# Patient Record
Sex: FEMALE | Race: BLACK OR AFRICAN AMERICAN | NOT HISPANIC OR LATINO | URBAN - METROPOLITAN AREA
[De-identification: names, ages, dates, MRNs, and addresses within clinical notes are randomized per-mention and may not be internally consistent; named-entity substitution may affect disease eponyms.]

---

## 2017-01-31 ENCOUNTER — EMERGENCY (EMERGENCY)
Facility: HOSPITAL | Age: 52
LOS: 1 days | Discharge: HOME | End: 2017-01-31
Attending: EMERGENCY MEDICINE | Admitting: EMERGENCY MEDICINE
Payer: MEDICAID

## 2017-01-31 VITALS
TEMPERATURE: 98 F | HEART RATE: 80 BPM | DIASTOLIC BLOOD PRESSURE: 89 MMHG | SYSTOLIC BLOOD PRESSURE: 147 MMHG | RESPIRATION RATE: 20 BRPM | OXYGEN SATURATION: 100 %

## 2017-01-31 VITALS
SYSTOLIC BLOOD PRESSURE: 176 MMHG | TEMPERATURE: 98 F | OXYGEN SATURATION: 100 % | DIASTOLIC BLOOD PRESSURE: 111 MMHG | RESPIRATION RATE: 16 BRPM | HEART RATE: 74 BPM

## 2017-01-31 DIAGNOSIS — S82.64XA NONDISPLACED FRACTURE OF LATERAL MALLEOLUS OF RIGHT FIBULA, INITIAL ENCOUNTER FOR CLOSED FRACTURE: ICD-10-CM

## 2017-01-31 DIAGNOSIS — Z88.8 ALLERGY STATUS TO OTHER DRUGS, MEDICAMENTS AND BIOLOGICAL SUBSTANCES: ICD-10-CM

## 2017-01-31 DIAGNOSIS — I10 ESSENTIAL (PRIMARY) HYPERTENSION: ICD-10-CM

## 2017-01-31 DIAGNOSIS — R42 DIZZINESS AND GIDDINESS: ICD-10-CM

## 2017-01-31 LAB
ALBUMIN SERPL ELPH-MCNC: 4.1 G/DL — SIGNIFICANT CHANGE UP (ref 3.4–5)
ALP SERPL-CCNC: 95 U/L — SIGNIFICANT CHANGE UP (ref 40–120)
ALT FLD-CCNC: 19 U/L — SIGNIFICANT CHANGE UP (ref 12–42)
ANION GAP SERPL CALC-SCNC: 10 MMOL/L — SIGNIFICANT CHANGE UP (ref 9–16)
AST SERPL-CCNC: 22 U/L — SIGNIFICANT CHANGE UP (ref 15–37)
BASOPHILS NFR BLD AUTO: 0.3 % — SIGNIFICANT CHANGE UP (ref 0–2)
BILIRUB SERPL-MCNC: 0.4 MG/DL — SIGNIFICANT CHANGE UP (ref 0.2–1.2)
BUN SERPL-MCNC: 14 MG/DL — SIGNIFICANT CHANGE UP (ref 7–23)
CALCIUM SERPL-MCNC: 9.8 MG/DL — SIGNIFICANT CHANGE UP (ref 8.5–10.5)
CHLORIDE SERPL-SCNC: 105 MMOL/L — SIGNIFICANT CHANGE UP (ref 96–108)
CO2 SERPL-SCNC: 27 MMOL/L — SIGNIFICANT CHANGE UP (ref 22–31)
CREAT SERPL-MCNC: 0.71 MG/DL — SIGNIFICANT CHANGE UP (ref 0.5–1.3)
EOSINOPHIL NFR BLD AUTO: 0.9 % — SIGNIFICANT CHANGE UP (ref 0–6)
GLUCOSE SERPL-MCNC: 99 MG/DL — SIGNIFICANT CHANGE UP (ref 70–99)
HCT VFR BLD CALC: 37 % — SIGNIFICANT CHANGE UP (ref 34.5–45)
HGB BLD-MCNC: 12.8 G/DL — SIGNIFICANT CHANGE UP (ref 11.5–15.5)
IMM GRANULOCYTES NFR BLD AUTO: 0.3 % — SIGNIFICANT CHANGE UP (ref 0–1.5)
LYMPHOCYTES # BLD AUTO: 29.2 % — SIGNIFICANT CHANGE UP (ref 13–44)
MCHC RBC-ENTMCNC: 30.3 PG — SIGNIFICANT CHANGE UP (ref 27–34)
MCHC RBC-ENTMCNC: 34.6 G/DL — SIGNIFICANT CHANGE UP (ref 32–36)
MCV RBC AUTO: 87.7 FL — SIGNIFICANT CHANGE UP (ref 80–100)
MONOCYTES NFR BLD AUTO: 6 % — SIGNIFICANT CHANGE UP (ref 2–14)
NEUTROPHILS NFR BLD AUTO: 63.3 % — SIGNIFICANT CHANGE UP (ref 43–77)
PLATELET # BLD AUTO: 193 K/UL — SIGNIFICANT CHANGE UP (ref 150–400)
POTASSIUM SERPL-MCNC: 3.8 MMOL/L — SIGNIFICANT CHANGE UP (ref 3.5–5.3)
POTASSIUM SERPL-SCNC: 3.8 MMOL/L — SIGNIFICANT CHANGE UP (ref 3.5–5.3)
PROT SERPL-MCNC: 8.5 G/DL — HIGH (ref 6.4–8.2)
RBC # BLD: 4.22 M/UL — SIGNIFICANT CHANGE UP (ref 3.8–5.2)
RBC # FLD: 13.3 % — SIGNIFICANT CHANGE UP (ref 10.3–16.9)
SODIUM SERPL-SCNC: 142 MMOL/L — SIGNIFICANT CHANGE UP (ref 132–145)
TROPONIN I SERPL-MCNC: <0.017 NG/ML — LOW (ref 0.02–0.06)
WBC # BLD: 6.9 K/UL — SIGNIFICANT CHANGE UP (ref 3.8–10.5)
WBC # FLD AUTO: 6.9 K/UL — SIGNIFICANT CHANGE UP (ref 3.8–10.5)

## 2017-01-31 PROCEDURE — 70450 CT HEAD/BRAIN W/O DYE: CPT | Mod: 26

## 2017-01-31 PROCEDURE — 27786 TREATMENT OF ANKLE FRACTURE: CPT | Mod: 54

## 2017-01-31 PROCEDURE — 99285 EMERGENCY DEPT VISIT HI MDM: CPT | Mod: 25

## 2017-01-31 PROCEDURE — 73610 X-RAY EXAM OF ANKLE: CPT | Mod: 26,RT

## 2017-01-31 PROCEDURE — 93010 ELECTROCARDIOGRAM REPORT: CPT | Mod: 59

## 2017-01-31 PROCEDURE — 71020: CPT | Mod: 26

## 2017-01-31 RX ORDER — SODIUM CHLORIDE 9 MG/ML
3 INJECTION INTRAMUSCULAR; INTRAVENOUS; SUBCUTANEOUS ONCE
Qty: 0 | Refills: 0 | Status: COMPLETED | OUTPATIENT
Start: 2017-01-31 | End: 2017-01-31

## 2017-01-31 RX ORDER — SODIUM CHLORIDE 9 MG/ML
1000 INJECTION INTRAMUSCULAR; INTRAVENOUS; SUBCUTANEOUS ONCE
Qty: 0 | Refills: 0 | Status: COMPLETED | OUTPATIENT
Start: 2017-01-31 | End: 2017-01-31

## 2017-01-31 RX ADMIN — SODIUM CHLORIDE 1000 MILLILITER(S): 9 INJECTION INTRAMUSCULAR; INTRAVENOUS; SUBCUTANEOUS at 15:33

## 2017-01-31 RX ADMIN — SODIUM CHLORIDE 3 MILLILITER(S): 9 INJECTION INTRAMUSCULAR; INTRAVENOUS; SUBCUTANEOUS at 15:26

## 2017-01-31 NOTE — ED PROCEDURE NOTE - CPROC ED POST PROC CARE GUIDE1
Instructed patient/caregiver regarding signs and symptoms of infection./Keep the cast/splint/dressing clean and dry./Verbal/written post procedure instructions were given to patient/caregiver./Instructed patient/caregiver to follow-up with primary care physician./Elevate the injured extremity as instructed.

## 2017-01-31 NOTE — ED PROVIDER NOTE - NS ED MD SCRIBE ATTENDING SCRIBE SECTIONS
HISTORY OF PRESENT ILLNESS/VITAL SIGNS( Pullset)/PHYSICAL EXAM/DISPOSITION/PAST MEDICAL/SURGICAL/SOCIAL HISTORY/HIV/REVIEW OF SYSTEMS PAST MEDICAL/SURGICAL/SOCIAL HISTORY/REVIEW OF SYSTEMS/HIV/RESULTS/HISTORY OF PRESENT ILLNESS/VITAL SIGNS( Pullset)/PHYSICAL EXAM/DISPOSITION

## 2017-01-31 NOTE — ED PROVIDER NOTE - MUSCULOSKELETAL, MLM
Spine appears normal, range of motion is not limited, mild right ankle swelling, no deformity proximal , Spine appears normal, range of motion is not limited, mild right ankle swelling, no deformity

## 2017-01-31 NOTE — ED ADULT TRIAGE NOTE - CHIEF COMPLAINT QUOTE
Pt presents with ankle pain s/p bending over to reach for something, feeling dizzy, then falling onto her buttocks and twisting her ankle. BP elevated in triage.

## 2017-01-31 NOTE — ED PROVIDER NOTE - OBJECTIVE STATEMENT
52 yo F with a hx of borderline DM, HTN, nkda, currently on water pill and Asprin presents s/p fall after having a dizziness episode while carrying her tray at work today. Pt states she felt dizzy and fell backwards. Denies head trauma, LOC, CP, SOB, abd pain, N/V, HA and cough. Pt states that she lives in assisted living. +right ankle pain. Notes that she has been feeling sick for the past couple of days and has an appointment with PCP coming up. Last smoked marijuana 2 months ago. Denies illicit drug use.

## 2017-01-31 NOTE — ED PROVIDER NOTE - PROGRESS NOTE DETAILS
Pt presented to the ER with brief episode of dizziness at work. Vital signs are stable, and EKG is normal. no neurovascular deficiency. work up including head CT and troponin within normal limits. Has non-displaced fibular fracture, splint applied. stable for dc and f/u with PMD and ortho.

## 2017-01-31 NOTE — ED PROVIDER NOTE - NEUROLOGICAL, MLM
Alert and oriented, no focal deficits, no motor or sensory deficits. Alert and oriented, no focal deficits, no motor or sensory deficits. CNs are intact, Strength 5/5 in all extremities, answers questions appropriately.

## 2017-02-07 PROBLEM — Z00.00 ENCOUNTER FOR PREVENTIVE HEALTH EXAMINATION: Status: ACTIVE | Noted: 2017-02-07

## 2017-02-08 ENCOUNTER — APPOINTMENT (OUTPATIENT)
Dept: ORTHOPEDIC SURGERY | Facility: CLINIC | Age: 52
End: 2017-02-08

## 2017-02-08 VITALS
TEMPERATURE: 98.1 F | HEIGHT: 65 IN | WEIGHT: 170 LBS | SYSTOLIC BLOOD PRESSURE: 125 MMHG | BODY MASS INDEX: 28.32 KG/M2 | DIASTOLIC BLOOD PRESSURE: 82 MMHG | HEART RATE: 80 BPM

## 2017-02-08 DIAGNOSIS — Z82.61 FAMILY HISTORY OF ARTHRITIS: ICD-10-CM

## 2017-02-08 DIAGNOSIS — Z78.9 OTHER SPECIFIED HEALTH STATUS: ICD-10-CM

## 2017-02-08 DIAGNOSIS — Z86.79 PERSONAL HISTORY OF OTHER DISEASES OF THE CIRCULATORY SYSTEM: ICD-10-CM

## 2017-02-08 DIAGNOSIS — Z87.39 PERSONAL HISTORY OF OTHER DISEASES OF THE MUSCULOSKELETAL SYSTEM AND CONNECTIVE TISSUE: ICD-10-CM

## 2017-02-09 PROBLEM — Z78.9 DOES NOT USE ILLICIT DRUGS: Status: ACTIVE | Noted: 2017-02-08

## 2017-02-09 PROBLEM — Z78.9 EXERCISES OCCASIONALLY: Status: ACTIVE | Noted: 2017-02-08

## 2017-02-09 PROBLEM — Z87.39 HISTORY OF RHEUMATOID ARTHRITIS: Status: RESOLVED | Noted: 2017-02-08 | Resolved: 2017-02-09

## 2017-02-09 PROBLEM — Z87.39 HISTORY OF ARTHRITIS: Status: RESOLVED | Noted: 2017-02-08 | Resolved: 2017-02-09

## 2017-02-09 PROBLEM — Z86.79 HISTORY OF HYPERTENSION: Status: RESOLVED | Noted: 2017-02-08 | Resolved: 2017-02-09

## 2017-02-09 PROBLEM — Z82.61 FAMILY HISTORY OF ARTHRITIS: Status: ACTIVE | Noted: 2017-02-08

## 2017-02-10 ENCOUNTER — FORM ENCOUNTER (OUTPATIENT)
Age: 52
End: 2017-02-10

## 2017-02-11 ENCOUNTER — OUTPATIENT (OUTPATIENT)
Dept: OUTPATIENT SERVICES | Facility: HOSPITAL | Age: 52
LOS: 1 days | End: 2017-02-11
Payer: MEDICAID

## 2017-02-11 ENCOUNTER — APPOINTMENT (OUTPATIENT)
Dept: CT IMAGING | Facility: CLINIC | Age: 52
End: 2017-02-11

## 2017-02-11 DIAGNOSIS — S82.851A DISPLACED TRIMALLEOLAR FRACTURE OF RIGHT LOWER LEG, INITIAL ENCOUNTER FOR CLOSED FRACTURE: ICD-10-CM

## 2017-02-11 PROCEDURE — 76377 3D RENDER W/INTRP POSTPROCES: CPT

## 2017-02-11 PROCEDURE — 73700 CT LOWER EXTREMITY W/O DYE: CPT

## 2017-02-14 ENCOUNTER — OUTPATIENT (OUTPATIENT)
Dept: OUTPATIENT SERVICES | Facility: HOSPITAL | Age: 52
LOS: 1 days | Discharge: HOME | End: 2017-02-14
Payer: MEDICAID

## 2017-02-14 DIAGNOSIS — Z98.51 TUBAL LIGATION STATUS: Chronic | ICD-10-CM

## 2017-02-14 DIAGNOSIS — Z98.891 HISTORY OF UTERINE SCAR FROM PREVIOUS SURGERY: Chronic | ICD-10-CM
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
## 2017-02-14 DIAGNOSIS — J45.909 UNSPECIFIED ASTHMA, UNCOMPLICATED: ICD-10-CM
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
## 2017-02-14 DIAGNOSIS — D57.3 SICKLE-CELL TRAIT: ICD-10-CM

## 2017-02-14 DIAGNOSIS — Z88.8 ALLERGY STATUS TO OTHER DRUGS, MEDICAMENTS AND BIOLOGICAL SUBSTANCES: ICD-10-CM

## 2017-02-14 DIAGNOSIS — S82.851P: ICD-10-CM

## 2017-02-14 DIAGNOSIS — S82.831A OTHER FRACTURE OF UPPER AND LOWER END OF RIGHT FIBULA, INITIAL ENCOUNTER FOR CLOSED FRACTURE: ICD-10-CM

## 2017-02-14 DIAGNOSIS — Z90.89 ACQUIRED ABSENCE OF OTHER ORGANS: Chronic | ICD-10-CM

## 2017-02-14 DIAGNOSIS — Z01.818 ENCOUNTER FOR OTHER PREPROCEDURAL EXAMINATION: ICD-10-CM

## 2017-02-14 LAB
ANION GAP SERPL CALC-SCNC: 9 MMOL/L — SIGNIFICANT CHANGE UP (ref 5–17)
APPEARANCE UR: CLEAR — SIGNIFICANT CHANGE UP
APTT BLD: 31.6 SEC — SIGNIFICANT CHANGE UP (ref 27.5–37.4)
BASOPHILS # BLD AUTO: 0.1 K/UL — SIGNIFICANT CHANGE UP (ref 0–0.2)
BASOPHILS NFR BLD AUTO: 0.8 % — SIGNIFICANT CHANGE UP (ref 0–2)
BILIRUB UR-MCNC: NEGATIVE — SIGNIFICANT CHANGE UP
BLD GP AB SCN SERPL QL: SIGNIFICANT CHANGE UP
BUN SERPL-MCNC: 21 MG/DL — SIGNIFICANT CHANGE UP (ref 7–23)
CALCIUM SERPL-MCNC: 9.1 MG/DL — SIGNIFICANT CHANGE UP (ref 8.5–10.1)
CHLORIDE SERPL-SCNC: 106 MMOL/L — SIGNIFICANT CHANGE UP (ref 96–108)
CO2 SERPL-SCNC: 28 MMOL/L — SIGNIFICANT CHANGE UP (ref 22–31)
COLOR SPEC: YELLOW — SIGNIFICANT CHANGE UP
CREAT SERPL-MCNC: 0.69 MG/DL — SIGNIFICANT CHANGE UP (ref 0.5–1.3)
DIFF PNL FLD: (no result)
EOSINOPHIL # BLD AUTO: 0.1 K/UL — SIGNIFICANT CHANGE UP (ref 0–0.5)
EOSINOPHIL NFR BLD AUTO: 1.5 % — SIGNIFICANT CHANGE UP (ref 0–6)
EPI CELLS # UR: SIGNIFICANT CHANGE UP
GLUCOSE SERPL-MCNC: 100 MG/DL — HIGH (ref 70–99)
GLUCOSE UR QL: NEGATIVE MG/DL — SIGNIFICANT CHANGE UP
HCT VFR BLD CALC: 34.2 % — LOW (ref 34.5–45)
HGB BLD-MCNC: 11.3 G/DL — LOW (ref 11.5–15.5)
INR BLD: 1.12 RATIO — SIGNIFICANT CHANGE UP (ref 0.88–1.16)
KETONES UR-MCNC: NEGATIVE — SIGNIFICANT CHANGE UP
LEUKOCYTE ESTERASE UR-ACNC: (no result)
LYMPHOCYTES # BLD AUTO: 1.9 K/UL — SIGNIFICANT CHANGE UP (ref 1–3.3)
LYMPHOCYTES # BLD AUTO: 29 % — SIGNIFICANT CHANGE UP (ref 13–44)
MCHC RBC-ENTMCNC: 30 PG — SIGNIFICANT CHANGE UP (ref 27–34)
MCHC RBC-ENTMCNC: 32.9 GM/DL — SIGNIFICANT CHANGE UP (ref 32–36)
MCV RBC AUTO: 91.1 FL — SIGNIFICANT CHANGE UP (ref 80–100)
MONOCYTES # BLD AUTO: 0.4 K/UL — SIGNIFICANT CHANGE UP (ref 0–0.9)
MONOCYTES NFR BLD AUTO: 5.5 % — SIGNIFICANT CHANGE UP (ref 2–14)
NEUTROPHILS # BLD AUTO: 4.2 K/UL — SIGNIFICANT CHANGE UP (ref 1.8–7.4)
NEUTROPHILS NFR BLD AUTO: 63.3 % — SIGNIFICANT CHANGE UP (ref 43–77)
NITRITE UR-MCNC: NEGATIVE — SIGNIFICANT CHANGE UP
PH UR: 5 — SIGNIFICANT CHANGE UP (ref 4.8–8)
PLATELET # BLD AUTO: 250 K/UL — SIGNIFICANT CHANGE UP (ref 150–400)
POTASSIUM SERPL-MCNC: 4 MMOL/L — SIGNIFICANT CHANGE UP (ref 3.5–5.3)
POTASSIUM SERPL-SCNC: 4 MMOL/L — SIGNIFICANT CHANGE UP (ref 3.5–5.3)
PROT UR-MCNC: NEGATIVE MG/DL — SIGNIFICANT CHANGE UP
PROTHROM AB SERPL-ACNC: 12.3 SEC — SIGNIFICANT CHANGE UP (ref 10–13.1)
RBC # BLD: 3.76 M/UL — LOW (ref 3.8–5.2)
RBC # FLD: 12.9 % — SIGNIFICANT CHANGE UP (ref 10.3–14.5)
RBC CASTS # UR COMP ASSIST: NEGATIVE /HPF — SIGNIFICANT CHANGE UP (ref 0–4)
SODIUM SERPL-SCNC: 143 MMOL/L — SIGNIFICANT CHANGE UP (ref 135–145)
SP GR SPEC: 1.01 — SIGNIFICANT CHANGE UP (ref 1.01–1.02)
TYPE + AB SCN PNL BLD: SIGNIFICANT CHANGE UP
UROBILINOGEN FLD QL: NEGATIVE MG/DL — SIGNIFICANT CHANGE UP
WBC # BLD: 6.6 K/UL — SIGNIFICANT CHANGE UP (ref 3.8–10.5)
WBC # FLD AUTO: 6.6 K/UL — SIGNIFICANT CHANGE UP (ref 3.8–10.5)
WBC UR QL: SIGNIFICANT CHANGE UP

## 2017-02-14 PROCEDURE — 93010 ELECTROCARDIOGRAM REPORT: CPT

## 2017-02-14 PROCEDURE — 71020: CPT | Mod: 26

## 2017-02-14 NOTE — CHART NOTE - NSCHARTNOTEFT_GEN_A_CORE
patient seen in PST encounter today:    V/S: T-97.4, P-86, R-14, B/P-152/93, o2 sat: 100% on room air.    EZ sponges, holistic sheet, and day of procedure instructions provided and reviewed with patient.    Clearance with PCP, to be scheduled and patient aware.

## 2017-02-14 NOTE — ASU PATIENT PROFILE, ADULT - PMH
Anemia    Ankle pain, right    Anxiety    Asthma    Diverticulitis    Fibula fracture  right  Hordeolum externum of left upper eyelid    Nasal polyps    Sickle cell trait    Tibia fracture  right

## 2017-02-14 NOTE — ASU PATIENT PROFILE, ADULT - VISION (WITH CORRECTIVE LENSES IF THE PATIENT USUALLY WEARS THEM):
Distance glasses/Partially impaired: cannot see medication labels or newsprint, but can see obstacles in path, and the surrounding layout; can count fingers at arm's length

## 2017-02-16 ENCOUNTER — INPATIENT (INPATIENT)
Facility: HOSPITAL | Age: 52
LOS: 3 days | Discharge: HOME HEALTH | End: 2017-02-20
Attending: ORTHOPAEDIC SURGERY | Admitting: ORTHOPAEDIC SURGERY
Payer: MEDICAID

## 2017-02-16 VITALS
HEART RATE: 101 BPM | DIASTOLIC BLOOD PRESSURE: 97 MMHG | SYSTOLIC BLOOD PRESSURE: 138 MMHG | RESPIRATION RATE: 18 BRPM | WEIGHT: 169.98 LBS | TEMPERATURE: 98 F | OXYGEN SATURATION: 100 %

## 2017-02-16 DIAGNOSIS — Z90.89 ACQUIRED ABSENCE OF OTHER ORGANS: Chronic | ICD-10-CM

## 2017-02-16 DIAGNOSIS — Z98.891 HISTORY OF UTERINE SCAR FROM PREVIOUS SURGERY: Chronic | ICD-10-CM

## 2017-02-16 DIAGNOSIS — Z98.51 TUBAL LIGATION STATUS: Chronic | ICD-10-CM

## 2017-02-16 LAB
ALBUMIN SERPL ELPH-MCNC: 4 G/DL — SIGNIFICANT CHANGE UP (ref 3.3–5)
ALP SERPL-CCNC: 99 U/L — SIGNIFICANT CHANGE UP (ref 40–120)
ALT FLD-CCNC: 24 U/L — SIGNIFICANT CHANGE UP (ref 12–78)
ANION GAP SERPL CALC-SCNC: 10 MMOL/L — SIGNIFICANT CHANGE UP (ref 5–17)
APTT BLD: 31.5 SEC — SIGNIFICANT CHANGE UP (ref 27.5–37.4)
AST SERPL-CCNC: 33 U/L — SIGNIFICANT CHANGE UP (ref 15–37)
BASOPHILS # BLD AUTO: 0.1 K/UL — SIGNIFICANT CHANGE UP (ref 0–0.2)
BASOPHILS NFR BLD AUTO: 0.6 % — SIGNIFICANT CHANGE UP (ref 0–2)
BILIRUB SERPL-MCNC: 0.2 MG/DL — SIGNIFICANT CHANGE UP (ref 0.2–1.2)
BUN SERPL-MCNC: 20 MG/DL — SIGNIFICANT CHANGE UP (ref 7–23)
CALCIUM SERPL-MCNC: 9.7 MG/DL — SIGNIFICANT CHANGE UP (ref 8.5–10.1)
CHLORIDE SERPL-SCNC: 105 MMOL/L — SIGNIFICANT CHANGE UP (ref 96–108)
CO2 SERPL-SCNC: 27 MMOL/L — SIGNIFICANT CHANGE UP (ref 22–31)
CREAT SERPL-MCNC: 0.72 MG/DL — SIGNIFICANT CHANGE UP (ref 0.5–1.3)
EOSINOPHIL # BLD AUTO: 0.1 K/UL — SIGNIFICANT CHANGE UP (ref 0–0.5)
EOSINOPHIL NFR BLD AUTO: 1.6 % — SIGNIFICANT CHANGE UP (ref 0–6)
GLUCOSE SERPL-MCNC: 93 MG/DL — SIGNIFICANT CHANGE UP (ref 70–99)
HCT VFR BLD CALC: 35.9 % — SIGNIFICANT CHANGE UP (ref 34.5–45)
HGB BLD-MCNC: 12.1 G/DL — SIGNIFICANT CHANGE UP (ref 11.5–15.5)
INR BLD: 1.14 RATIO — SIGNIFICANT CHANGE UP (ref 0.88–1.16)
LYMPHOCYTES # BLD AUTO: 2.4 K/UL — SIGNIFICANT CHANGE UP (ref 1–3.3)
LYMPHOCYTES # BLD AUTO: 25.2 % — SIGNIFICANT CHANGE UP (ref 13–44)
MCHC RBC-ENTMCNC: 30.9 PG — SIGNIFICANT CHANGE UP (ref 27–34)
MCHC RBC-ENTMCNC: 33.8 GM/DL — SIGNIFICANT CHANGE UP (ref 32–36)
MCV RBC AUTO: 91.6 FL — SIGNIFICANT CHANGE UP (ref 80–100)
MONOCYTES # BLD AUTO: 0.4 K/UL — SIGNIFICANT CHANGE UP (ref 0–0.9)
MONOCYTES NFR BLD AUTO: 4.5 % — SIGNIFICANT CHANGE UP (ref 2–14)
NEUTROPHILS # BLD AUTO: 6.4 K/UL — SIGNIFICANT CHANGE UP (ref 1.8–7.4)
NEUTROPHILS NFR BLD AUTO: 68.1 % — SIGNIFICANT CHANGE UP (ref 43–77)
PLATELET # BLD AUTO: 277 K/UL — SIGNIFICANT CHANGE UP (ref 150–400)
POTASSIUM SERPL-MCNC: 4.2 MMOL/L — SIGNIFICANT CHANGE UP (ref 3.5–5.3)
POTASSIUM SERPL-SCNC: 4.2 MMOL/L — SIGNIFICANT CHANGE UP (ref 3.5–5.3)
PROT SERPL-MCNC: 8.4 GM/DL — HIGH (ref 6–8.3)
PROTHROM AB SERPL-ACNC: 12.6 SEC — SIGNIFICANT CHANGE UP (ref 10–13.1)
RBC # BLD: 3.92 M/UL — SIGNIFICANT CHANGE UP (ref 3.8–5.2)
RBC # FLD: 13.5 % — SIGNIFICANT CHANGE UP (ref 10.3–14.5)
SODIUM SERPL-SCNC: 142 MMOL/L — SIGNIFICANT CHANGE UP (ref 135–145)
WBC # BLD: 9.4 K/UL — SIGNIFICANT CHANGE UP (ref 3.8–10.5)
WBC # FLD AUTO: 9.4 K/UL — SIGNIFICANT CHANGE UP (ref 3.8–10.5)

## 2017-02-16 PROCEDURE — 71020: CPT | Mod: 26

## 2017-02-16 PROCEDURE — 73610 X-RAY EXAM OF ANKLE: CPT | Mod: 26,RT

## 2017-02-16 PROCEDURE — 73590 X-RAY EXAM OF LOWER LEG: CPT | Mod: 26,RT

## 2017-02-16 PROCEDURE — 73560 X-RAY EXAM OF KNEE 1 OR 2: CPT | Mod: 26,RT

## 2017-02-16 PROCEDURE — 99285 EMERGENCY DEPT VISIT HI MDM: CPT

## 2017-02-16 PROCEDURE — 73630 X-RAY EXAM OF FOOT: CPT | Mod: 26,LT

## 2017-02-16 PROCEDURE — 93010 ELECTROCARDIOGRAM REPORT: CPT

## 2017-02-16 RX ORDER — SODIUM CHLORIDE 9 MG/ML
1000 INJECTION INTRAMUSCULAR; INTRAVENOUS; SUBCUTANEOUS
Qty: 0 | Refills: 0 | Status: DISCONTINUED | OUTPATIENT
Start: 2017-02-16 | End: 2017-02-17

## 2017-02-16 RX ORDER — OXYCODONE HYDROCHLORIDE 5 MG/1
10 TABLET ORAL EVERY 4 HOURS
Qty: 0 | Refills: 0 | Status: DISCONTINUED | OUTPATIENT
Start: 2017-02-16 | End: 2017-02-20

## 2017-02-16 RX ORDER — MORPHINE SULFATE 50 MG/1
4 CAPSULE, EXTENDED RELEASE ORAL
Qty: 0 | Refills: 0 | Status: DISCONTINUED | OUTPATIENT
Start: 2017-02-16 | End: 2017-02-20

## 2017-02-16 RX ORDER — HEPARIN SODIUM 5000 [USP'U]/ML
5000 INJECTION INTRAVENOUS; SUBCUTANEOUS ONCE
Qty: 0 | Refills: 0 | Status: COMPLETED | OUTPATIENT
Start: 2017-02-16 | End: 2017-02-16

## 2017-02-16 RX ORDER — ALBUTEROL 90 UG/1
1 AEROSOL, METERED ORAL EVERY 6 HOURS
Qty: 0 | Refills: 0 | Status: DISCONTINUED | OUTPATIENT
Start: 2017-02-16 | End: 2017-02-18

## 2017-02-16 RX ORDER — OXYCODONE HYDROCHLORIDE 5 MG/1
5 TABLET ORAL EVERY 4 HOURS
Qty: 0 | Refills: 0 | Status: DISCONTINUED | OUTPATIENT
Start: 2017-02-16 | End: 2017-02-20

## 2017-02-16 RX ORDER — ACETAMINOPHEN 500 MG
650 TABLET ORAL EVERY 6 HOURS
Qty: 0 | Refills: 0 | Status: DISCONTINUED | OUTPATIENT
Start: 2017-02-16 | End: 2017-02-20

## 2017-02-16 RX ORDER — DOCUSATE SODIUM 100 MG
100 CAPSULE ORAL DAILY
Qty: 0 | Refills: 0 | Status: DISCONTINUED | OUTPATIENT
Start: 2017-02-16 | End: 2017-02-20

## 2017-02-16 RX ORDER — ALBUTEROL 90 UG/1
2 AEROSOL, METERED ORAL EVERY 6 HOURS
Qty: 0 | Refills: 0 | Status: DISCONTINUED | OUTPATIENT
Start: 2017-02-16 | End: 2017-02-16

## 2017-02-16 RX ORDER — ACETAMINOPHEN 500 MG
1000 TABLET ORAL ONCE
Qty: 0 | Refills: 0 | Status: COMPLETED | OUTPATIENT
Start: 2017-02-16 | End: 2017-02-16

## 2017-02-16 RX ADMIN — OXYCODONE HYDROCHLORIDE 10 MILLIGRAM(S): 5 TABLET ORAL at 22:43

## 2017-02-16 RX ADMIN — HEPARIN SODIUM 5000 UNIT(S): 5000 INJECTION INTRAVENOUS; SUBCUTANEOUS at 22:36

## 2017-02-16 NOTE — ED STATDOCS - MUSCULOSKELETAL, MLM
KNee no biny tenderness no obvious deformities cast in place below knee to the toes, NVI, Cap refill less than 2 sec. Right Knee no bony tenderness, no obvious deformities cast in place below knee to the toes on RLE, NVI, Cap refill less than 2 sec.

## 2017-02-16 NOTE — H&P ADULT - ASSESSMENT
51yF w/ Right Ankle Fracture    - Pain control  - Admit to Orthopedics  - NPO after midnight except meds  - HOLD AC after midnight  - IVF while NPO  - Plan for patient to undergo Right Ankle ORIF w/ Dr. Bella tomorrow, 2/17/17.  - Pt requires medical optimization prior to OR  - FU labs  - D/w attending, agrees w/ plan

## 2017-02-16 NOTE — CONSULT NOTE ADULT - ASSESSMENT
S/P MECHANICAL FALL 3 WEEKS WITH SUBACUTE RIGHT ANKLE FRACTURE, NOW WITH ANOTHER FALL AND INCREASING PAIN    ASTHMA - STABLE, NO ACUTE EXACERBATION    POSSIBLE STYE VS. CYST TO THE LEFT UPPER EYE LID        RECOMMENDATIONS:  -  continue Albuterol  -  check electrolytes  -  patient is low risk for intermediate risk orthopedic surgery  -  if electrolytes are normal, then there is no medical contraindication to proceed with surgery  -  patient does not meet criteria for lopez-operative beta blockers  -  pain control  -  NPO after MN  -  DVT prophylaxis  -  warm compresses to the left upper eyelid  -  will follow with you    d/w patient and ortho residents

## 2017-02-16 NOTE — H&P ADULT - HISTORY OF PRESENT ILLNESS
51yF presents to  ED c/o Right Ankle Pain. 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 Was seen as outpatient by Dr. Bridger Bella who stated that she needs surgery to fix ankle fracture. Pt now presents today in significant amount of pain 2/2 to another fall earlier today while in the shower. Currently C/o pain in Right Foot/Ankle. States that she has slight decrease in feeling in her foot. Denies any other numbness, tingling, burning.  Denies any other injuries at current time. Pt    All: Aspirin  PMH: Asthma  PSH: C Section x 4  Meds: Ventolin

## 2017-02-16 NOTE — CONSULT NOTE ADULT - SUBJECTIVE AND OBJECTIVE BOX
CHIEF COMPLAINT:     HISTORY OF THE PRESENT ILLNESS:    PAST MEDICAL HISTORY:    PAST SURGICAL HISTORY:    FAMILY HISTORY:   non-contributory to the patient's current presentation    SOCIAL HISTORY:  no smoking, no alcohol, no drugs    REVIEW OF SYSTEMS:   All 10 systems reviewed in detailed and found to be negative with the exception of what has already been described above    MEDICATIONS  (STANDING):  acetaminophen   Tablet 1000milliGRAM(s) Oral once    MEDICATIONS  (PRN):  ALBUTerol    90 MICROgram(s) HFA Inhaler 2Puff(s) Inhalation every 6 hours PRN Shortness of Breath and/or Wheezing  docusate sodium 100milliGRAM(s) Oral daily PRN Constipation      VITALS SIGNS:  T(F): 98.4, Max: 98.4 (02-16 @ 13:36)  HR: 101 (101 - 101)  BP: 138/97 (138/97 - 138/97)  RR: 18 (18 - 18)  SpO2: 100% (100% - 100%)  Wt(kg): --    I&O's Summary      CAPILLARY BLOOD GLUCOSE      PHYSICAL EXAM:    HEENT:  pupils equal and reactive, EOMI, no oropharyngeal lesions, erythema, exudates, oral thrush    NECK:   supple, no carotid bruits, no palpable lymph nodes, no thyromegaly    CV:  +S1, +S2, regular, no murmurs or rubs    RESP:   lungs clear to auscultation bilaterally, no wheezing, rales, rhonchi, good air entry bilaterally    BREAST:  not examined    GI:  abdomen soft, non-tender, non-distended, normal BS, no bruits, no abdominal masses, no palpable masses    RECTAL:  not examined    :  not examined    MSK:   normal muscle tone, no atrophy, no rigidity, no contractions    EXT:   no clubbing, no cyanosis, no edema, no calf pain, swelling or erythema    VASCULAR:  pulses equal and symmetric in the upper and lower extremities    NEURO:  AAOX3, no focal neurological deficits, follows all commands, able to move extremities spontaneously    SKIN:  no ulcers, lesions or rashes    LABS:                                                      EKG:     RADIOLOGY STUDIES:      ASSESSMENT:        PLAN:        d/w patient, ED RN and ED physician    Time Spent: PCP - NONE    CHIEF COMPLAINT:   right ankle pain    HISTORY OF THE PRESENT ILLNESS:  51 F with Hx of Asthma sustained a fall at work about 3 weeks ago and fractured her right ankle.  She was evaluated by Dr. Bella in the office and was told that she needed surgery to repair the fracture.  While in the shower today, she had another fall and developed increasing pain in the right ankle.  She came to the ED for an evaluation.  At the time of the fall at work, she had no shortness of breath, chest pain, pleuritic pain, dizziness, palpatations, lightheadedness.    PAST MEDICAL HISTORY:  Asthma -  uses ventolin twice a day.  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    REVIEW OF SYSTEMS:   All 10 systems reviewed in detailed and found to be negative with the exception of what has already been described above    MEDICATIONS  (STANDING):  acetaminophen   Tablet 1000milliGRAM(s) Oral once    MEDICATIONS  (PRN):  ALBUTerol    90 MICROgram(s) HFA Inhaler 2Puff(s) Inhalation every 6 hours PRN Shortness of Breath and/or Wheezing  docusate sodium 100milliGRAM(s) Oral daily PRN Constipation      VITALS SIGNS:  T(F): 98.4, Max: 98.4 (02-16 @ 13:36)  HR: 101 (101 - 101)  BP: 138/97 (138/97 - 138/97)  RR: 18 (18 - 18)  SpO2: 100% (100% - 100%)  Wt(kg): --    I&O's Summary      PHYSICAL EXAM:    HEENT:  pupils equal and reactive, EOMI, no oropharyngeal lesions, erythema, exudates, oral thrush, left upper eye lid swelling    NECK:   supple, no carotid bruits, no palpable lymph nodes, no thyromegaly    CV:  +S1, +S2, regular, no murmurs or rubs    RESP:   lungs clear to auscultation bilaterally, no wheezing, rales, rhonchi, good air entry bilaterally    BREAST:  not examined    GI:  abdomen soft, non-tender, non-distended, normal BS, no bruits, no abdominal masses, no palpable masses    RECTAL:  not examined    :  not examined    MSK:   normal muscle tone, no atrophy, no rigidity, no contractions    EXT:   no clubbing, no cyanosis, no edema, no calf pain, swelling or erythema, cast over right ankle    VASCULAR:  pulses equal and symmetric in the upper and lower extremities    NEURO:  AAOX3, no focal neurological deficits, follows all commands, able to move extremities spontaneously    SKIN:  no ulcers, lesions or rashes      LABS:                          12.1   9.4   )-----------( 277      ( 2017 20:10 )             35.9       PT/INR - ( 2017 20:10 )   PT: 12.6 sec;   INR: 1.14 ratio         PTT - ( 2017 20:10 )  PTT:31.5 sec      EKG - NSR, LAD, LVH, normal RWP, atrial enlargement, no acute ischemic changes, QTc 428, no old EKG for comparison      RADIOLOGY:    EXAM:  CR TIB-FIB RIGHT                            PROCEDURE DATE:  2017        INTERPRETATION:  Exam Date: 2017 3:11 PM    Radiographs of the right tibia and fibula    CLINICAL INFORMATION:  trauma    TECHNIQUE:  Frontal and lateral views of the tibia and fibula were   obtained.    Findings/  impression:    There is a cast overlying fracture of distal right fibula. There is   minimal posterior displacement of distal fracture fragment with   relationship to the proximal fracture fragment. Soft tissues are grossly   intact.      EXAM:  CHEST P.A. LATERAL                            PROCEDURE DATE:  2017        INTERPRETATION:      INDICATION: 51-year-old preadmission.    No previous studies are available for comparison.    FINDINGS:       The lungs are of equal and symmetric in aeration. The bronchovascular   markings are unremarkable.  There is no acute parenchymal consolidation.    There is no pleural effusion. The cardiomediastinal silhouette is within   normal limits.  There is no acute osseous finding.    IMPRESSION:  Clear lungs.

## 2017-02-16 NOTE — ED STATDOCS - MEDICAL DECISION MAKING DETAILS
50 y/o F with foot injury s/p fall about one month ago. Will treat with pain meds, x-rays. Then consult Ortho.

## 2017-02-16 NOTE — ED STATDOCS - PROGRESS NOTE DETAILS
OLYA Sung: Patient has been seen, evaluated and orders have been written by the attending in intake. Patient is stable.  I will follow up the results of orders written and I will continue to evalute/observe the patient. OLYA Sung: paged dr rowe OLYA Sung: pt aware waiting for ortho resident or dr rowe to return call. pt comfortable in chair, elevating leg. OLYA Sung: spoke with ortho residentrufina pt OLYA Sung: pt aware ortho consult still pending. pt comfortable. as per order set seems as if ortho added xray ankle, informed pt. OLYA Sung: ortho team in ed to see pt. pt is being admitted to ortho team for OR tomorrow. need pre-op labs, ekg, cxr (ordered from ed). pt aware. The history, relevant review of systems, past medical and surgical history, medical decision making, and physical examination was documented by the scribe in my presence and I attest to the accuracy of the documentation.

## 2017-02-16 NOTE — ED STATDOCS - NS ED MD SCRIBE ATTENDING SCRIBE SECTIONS
PAST MEDICAL/SURGICAL/SOCIAL HISTORY/HISTORY OF PRESENT ILLNESS/REVIEW OF SYSTEMS/VITAL SIGNS( Pullset)/PHYSICAL EXAM/DISPOSITION

## 2017-02-16 NOTE — ED STATDOCS - OBJECTIVE STATEMENT
52 y/o F w/ hx of asthma presents to the ED for right foot pain with onset hours pta. Pt was seen in [facility] [place], s/p fall in 1/2017. Pt f/u with Dr. Bella Ortho, one week ago who states pt is to have surgery. States today she was in the shower when she tripped and now has increasing pain. Took pain meds pta in ED. Denies fever, chills, abd pain, head injury, LOC.

## 2017-02-17 ENCOUNTER — APPOINTMENT (OUTPATIENT)
Dept: ORTHOPEDIC SURGERY | Facility: HOSPITAL | Age: 52
End: 2017-02-17

## 2017-02-17 LAB
ANION GAP SERPL CALC-SCNC: 9 MMOL/L — SIGNIFICANT CHANGE UP (ref 5–17)
APTT BLD: 30.8 SEC — SIGNIFICANT CHANGE UP (ref 27.5–37.4)
BASOPHILS # BLD AUTO: 0.1 K/UL — SIGNIFICANT CHANGE UP (ref 0–0.2)
BASOPHILS NFR BLD AUTO: 0.7 % — SIGNIFICANT CHANGE UP (ref 0–2)
BLD GP AB SCN SERPL QL: SIGNIFICANT CHANGE UP
BUN SERPL-MCNC: 14 MG/DL — SIGNIFICANT CHANGE UP (ref 7–23)
CALCIUM SERPL-MCNC: 9.2 MG/DL — SIGNIFICANT CHANGE UP (ref 8.5–10.1)
CHLORIDE SERPL-SCNC: 105 MMOL/L — SIGNIFICANT CHANGE UP (ref 96–108)
CO2 SERPL-SCNC: 27 MMOL/L — SIGNIFICANT CHANGE UP (ref 22–31)
CREAT SERPL-MCNC: 0.54 MG/DL — SIGNIFICANT CHANGE UP (ref 0.5–1.3)
EOSINOPHIL # BLD AUTO: 0.1 K/UL — SIGNIFICANT CHANGE UP (ref 0–0.5)
EOSINOPHIL NFR BLD AUTO: 1.5 % — SIGNIFICANT CHANGE UP (ref 0–6)
GLUCOSE SERPL-MCNC: 88 MG/DL — SIGNIFICANT CHANGE UP (ref 70–99)
HCG SERPL-ACNC: <1 MIU/ML — SIGNIFICANT CHANGE UP
HCT VFR BLD CALC: 38.9 % — SIGNIFICANT CHANGE UP (ref 34.5–45)
HGB BLD-MCNC: 12.5 G/DL — SIGNIFICANT CHANGE UP (ref 11.5–15.5)
INR BLD: 1.22 RATIO — HIGH (ref 0.88–1.16)
LYMPHOCYTES # BLD AUTO: 2.1 K/UL — SIGNIFICANT CHANGE UP (ref 1–3.3)
LYMPHOCYTES # BLD AUTO: 27.7 % — SIGNIFICANT CHANGE UP (ref 13–44)
MCHC RBC-ENTMCNC: 29.7 PG — SIGNIFICANT CHANGE UP (ref 27–34)
MCHC RBC-ENTMCNC: 32.1 GM/DL — SIGNIFICANT CHANGE UP (ref 32–36)
MCV RBC AUTO: 92.6 FL — SIGNIFICANT CHANGE UP (ref 80–100)
MONOCYTES # BLD AUTO: 0.4 K/UL — SIGNIFICANT CHANGE UP (ref 0–0.9)
MONOCYTES NFR BLD AUTO: 5.3 % — SIGNIFICANT CHANGE UP (ref 2–14)
NEUTROPHILS # BLD AUTO: 4.8 K/UL — SIGNIFICANT CHANGE UP (ref 1.8–7.4)
NEUTROPHILS NFR BLD AUTO: 64.7 % — SIGNIFICANT CHANGE UP (ref 43–77)
PLATELET # BLD AUTO: 266 K/UL — SIGNIFICANT CHANGE UP (ref 150–400)
POTASSIUM SERPL-MCNC: 3.9 MMOL/L — SIGNIFICANT CHANGE UP (ref 3.5–5.3)
POTASSIUM SERPL-SCNC: 3.9 MMOL/L — SIGNIFICANT CHANGE UP (ref 3.5–5.3)
PROTHROM AB SERPL-ACNC: 13.4 SEC — HIGH (ref 10–13.1)
RBC # BLD: 4.2 M/UL — SIGNIFICANT CHANGE UP (ref 3.8–5.2)
RBC # FLD: 13.7 % — SIGNIFICANT CHANGE UP (ref 10.3–14.5)
SODIUM SERPL-SCNC: 141 MMOL/L — SIGNIFICANT CHANGE UP (ref 135–145)
TYPE + AB SCN PNL BLD: SIGNIFICANT CHANGE UP
WBC # BLD: 7.4 K/UL — SIGNIFICANT CHANGE UP (ref 3.8–10.5)
WBC # FLD AUTO: 7.4 K/UL — SIGNIFICANT CHANGE UP (ref 3.8–10.5)

## 2017-02-17 RX ORDER — ACETAMINOPHEN 500 MG
650 TABLET ORAL EVERY 6 HOURS
Qty: 0 | Refills: 0 | Status: DISCONTINUED | OUTPATIENT
Start: 2017-02-17 | End: 2017-02-20

## 2017-02-17 RX ORDER — ALBUTEROL 90 UG/1
2 AEROSOL, METERED ORAL EVERY 6 HOURS
Qty: 0 | Refills: 0 | Status: DISCONTINUED | OUTPATIENT
Start: 2017-02-17 | End: 2017-02-20

## 2017-02-17 RX ORDER — SODIUM CHLORIDE 9 MG/ML
1000 INJECTION, SOLUTION INTRAVENOUS
Qty: 0 | Refills: 0 | Status: DISCONTINUED | OUTPATIENT
Start: 2017-02-17 | End: 2017-02-20

## 2017-02-17 RX ORDER — HYDROMORPHONE HYDROCHLORIDE 2 MG/ML
0.5 INJECTION INTRAMUSCULAR; INTRAVENOUS; SUBCUTANEOUS
Qty: 0 | Refills: 0 | Status: DISCONTINUED | OUTPATIENT
Start: 2017-02-17 | End: 2017-02-18

## 2017-02-17 RX ORDER — FENTANYL CITRATE 50 UG/ML
50 INJECTION INTRAVENOUS
Qty: 0 | Refills: 0 | Status: DISCONTINUED | OUTPATIENT
Start: 2017-02-17 | End: 2017-02-17

## 2017-02-17 RX ORDER — OXYCODONE HYDROCHLORIDE 5 MG/1
5 TABLET ORAL EVERY 4 HOURS
Qty: 0 | Refills: 0 | Status: DISCONTINUED | OUTPATIENT
Start: 2017-02-17 | End: 2017-02-20

## 2017-02-17 RX ORDER — HYDROMORPHONE HYDROCHLORIDE 2 MG/ML
0.5 INJECTION INTRAMUSCULAR; INTRAVENOUS; SUBCUTANEOUS
Qty: 0 | Refills: 0 | Status: DISCONTINUED | OUTPATIENT
Start: 2017-02-17 | End: 2017-02-17

## 2017-02-17 RX ORDER — ENOXAPARIN SODIUM 100 MG/ML
40 INJECTION SUBCUTANEOUS DAILY
Qty: 0 | Refills: 0 | Status: DISCONTINUED | OUTPATIENT
Start: 2017-02-17 | End: 2017-02-20

## 2017-02-17 RX ORDER — OXYCODONE HYDROCHLORIDE 5 MG/1
10 TABLET ORAL EVERY 4 HOURS
Qty: 0 | Refills: 0 | Status: DISCONTINUED | OUTPATIENT
Start: 2017-02-17 | End: 2017-02-20

## 2017-02-17 RX ORDER — ONDANSETRON 8 MG/1
4 TABLET, FILM COATED ORAL EVERY 4 HOURS
Qty: 0 | Refills: 0 | Status: DISCONTINUED | OUTPATIENT
Start: 2017-02-17 | End: 2017-02-17

## 2017-02-17 RX ORDER — CEFAZOLIN SODIUM 1 G
2000 VIAL (EA) INJECTION EVERY 6 HOURS
Qty: 0 | Refills: 0 | Status: COMPLETED | OUTPATIENT
Start: 2017-02-17 | End: 2017-02-18

## 2017-02-17 RX ORDER — SODIUM CHLORIDE 9 MG/ML
1000 INJECTION, SOLUTION INTRAVENOUS
Qty: 0 | Refills: 0 | Status: DISCONTINUED | OUTPATIENT
Start: 2017-02-17 | End: 2017-02-17

## 2017-02-17 RX ADMIN — SODIUM CHLORIDE 75 MILLILITER(S): 9 INJECTION INTRAMUSCULAR; INTRAVENOUS; SUBCUTANEOUS at 00:36

## 2017-02-17 RX ADMIN — Medication 650 MILLIGRAM(S): at 20:14

## 2017-02-17 RX ADMIN — OXYCODONE HYDROCHLORIDE 5 MILLIGRAM(S): 5 TABLET ORAL at 05:51

## 2017-02-17 RX ADMIN — SODIUM CHLORIDE 75 MILLILITER(S): 9 INJECTION, SOLUTION INTRAVENOUS at 17:23

## 2017-02-17 RX ADMIN — Medication 100 MILLIGRAM(S): at 20:14

## 2017-02-17 NOTE — PROCEDURE NOTE - ADDITIONAL PROCEDURE DETAILS
Right Adductor Canal Block Note:  Pt awoke from anesthesia c/o 10/10 medial ankle pain.  Time out performed, sterile prep with chlorhexidine and drape, ultrasound-guided, 21G 4" stimuplex needle, good visualization of needle and nerve at all times, 20cc of 0.25% Ropivacaine injected easily, no heme after aspirating every 5cc, no intraneural injection, no paresthesia.  Procedure well tolerated without complications.

## 2017-02-17 NOTE — PROGRESS NOTE ADULT - SUBJECTIVE AND OBJECTIVE BOX
Ortho PreOp  Dx: R Ankle Fracture  Sx: R Ankle ORIF  Surgeon: Shayne    EKG: Done  CXR: Done  UA: Pending  T&S: O+, Neg Ab                          12.1   9.4   )-----------( 277      ( 16 Feb 2017 20:10 )             35.9     142 | 105 | 20   | 93  4.2  | 27  | 0.72    PT/INR - ( 16 Feb 2017 20:10 )   PT: 12.6 sec;   INR: 1.14 ratio         PTT - ( 16 Feb 2017 20:10 )  PTT:31.5 sec    a/p: 51yFemale w/ DISPLACED FRACTURE DISTAL RIGHT FIBULA WITH MALUNION    Analgesia  NWB  IVF/NPO/Hold AC  FU AM labs  Pt Medically Cleared for OR  Plan for definitive surgical management in AM, 2/17

## 2017-02-17 NOTE — PROGRESS NOTE ADULT - ASSESSMENT
51F with right ankle trimalleolar ankle fracture    -Pain control  - Admit to Orthopedics  - HOLD AC after midnight  - IVF while NPO  - Plan for patient to undergo Right Ankle ORIF w/ Dr. Bella, 2/17/17.  - Medical optimization appreciated.  -All risks, benefits, and alternatives were discussed at length with the patient.  The identical discussion that was had in the office which includes but is not limited to bleeding, infection, nerve damage, vascular damage, malunion, nonunion, DVT, pe, loss of limb, loss of life or the risks associated with general anesthesia were discussed.  All questions answered.  Postop recovery discussed as well.    -Pt wished to move forward with surgery to limit post traumatic arthritis.

## 2017-02-17 NOTE — PATIENT PROFILE ADULT. - REASON FOR ADMISSION
Pt. states that I slipped in the shower this morning and I had a lot of pain and black and blue up to the knee.

## 2017-02-17 NOTE — PROCEDURE NOTE - ADDITIONAL PROCEDURE DETAILS
Right Popliteal Nerve Block Note:  Time out performed, pt awake and alert, sterile prep with chlorhexidine and drape, ultrasound-guided, 21G 4" stimuplex needle, good visualization of needle and nerve at all times, 20cc of 0.5% Ropivacaine injected easily, no heme after aspirating every 5cc, no intraneural injection, no paresthesia.  Procedure well tolerated without complications.

## 2017-02-17 NOTE — PROGRESS NOTE ADULT - SUBJECTIVE AND OBJECTIVE BOX
PCP - NONE    CHIEF COMPLAINT:   right ankle pain    HISTORY OF THE PRESENT ILLNESS:  51 F with Hx of Asthma (intubated 3x in past, last 1990s) sustained a fall at work about 3 weeks ago and fractured her right ankle.  She was evaluated by Dr. Bella in the office and was told that she needed surgery to repair the fracture.  While in the shower today, she had another fall and developed increasing pain in the right ankle.  She came to the ED for an evaluation.  At the time of the fall at work, she had no shortness of breath, chest pain, pleuritic pain, dizziness, palpatations, lightheadedness.    2/17- seen and examined this morning.  No complaints.     10point ROS negative.    VITALS SIGNS:  Vital Signs Last 24 Hrs  T(C): 37.1, Max: 37.1 (02-17 @ 16:05)  T(F): 98.7, Max: 98.7 (02-17 @ 16:05)  HR: 82 (74 - 98)  BP: 139/75 (138/87 - 166/95)  RR: 14 (14 - 18)  SpO2: 99% (98% - 100%)        PHYSICAL EXAM:  General: NAD, comfortable  Neuro: AAOx3, no focal deficits  HEENT: NCAT  Neck: supple  Respiratory: CTA b/l, no w/r/r  Cardiovascular: S1 and S2, RRR, no m/g/r  Gastrointestinal: +BS, soft, NTND, no rebound/guarding  Extremities: No c/c/e, rt ankle dressing  Vascular: 2+ peripheral pulses         LABS: All Labs Reviewed:                        12.5   7.4   )-----------( 266      ( 17 Feb 2017 06:51 )             38.9     17 Feb 2017 06:51    141    |  105    |  14     ----------------------------<  88     3.9     |  27     |  0.54     Ca    9.2        17 Feb 2017 06:51    TPro  8.4    /  Alb  4.0    /  TBili  0.2    /  DBili  x      /  AST  33     /  ALT  24     /  AlkPhos  99     16 Feb 2017 20:10    PT/INR - ( 17 Feb 2017 06:51 )   PT: 13.4 sec;   INR: 1.22 ratio         PTT - ( 17 Feb 2017 06:51 )  PTT:30.8 sec         EKG - NSR, LAD, LVH, normal RWP, atrial enlargement, no acute ischemic changes, QTc 428, no old EKG for comparison      RADIOLOGY:  EXAM:  CR TIB-FIB RIGHT                          PROCEDURE DATE:  02/16/2017      There is a cast overlying fracture of distal right fibula. There is   minimal posterior displacement of distal fracture fragment with   relationship to the proximal fracture fragment. Soft tissues are grossly   intact.      EXAM:  CHEST P.A. LATERAL                        PROCEDURE DATE:  02/14/2017    The lungs are of equal and symmetric in aeration. The bronchovascular   markings are unremarkable.  There is no acute parenchymal consolidation.    There is no pleural effusion. The cardiomediastinal silhouette is within   normal limits.  There is no acute osseous finding.  IMPRESSION:  Clear lungs.

## 2017-02-17 NOTE — PROGRESS NOTE ADULT - SUBJECTIVE AND OBJECTIVE BOX
Patient seen and examined. Resting comfortably. Pain control adaquate    Vital Signs Last 24 Hrs  T(C): 36.8, Max: 36.9 (02-16 @ 13:36)  T(F): 98.2, Max: 98.4 (02-16 @ 13:36)  HR: 76 (76 - 101)  BP: 154/96 (138/97 - 154/96)  BP(mean): --  RR: 18 (18 - 18)  SpO2: 98% (98% - 100%)    Gen: NAD  RLE:   In splint  L2-S1 SILT intact, decreased distally, no acute change  Patient able to move toes  Brisk cap refill  Compartments soft  No calf TTP

## 2017-02-17 NOTE — PROGRESS NOTE ADULT - SUBJECTIVE AND OBJECTIVE BOX
51yF presents to  ED c/o Right Ankle Pain. 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 Was seen as outpatient and indicated for surgery to fix the right ankle fracture. Pt now presents today in significant amount of pain 2/2 to another fall earlier today while in the shower. Currently C/o pain in Right Foot/Ankle. States that she has slight decrease in feeling in her foot. Denies any other numbness, tingling, burning.  Denies any other injuries at current time. Pt    All: Aspirin  PMH: Asthma  PSH: C Section x 4  Meds: Ventolin    Review of Systems:  Other Review of Systems: All other review of systems negative, except as noted in HPI	      Allergies and Intolerances:        Allergies:  	aspirin: Drug, Short breath      Patient History:   Past Medical History:  Anemia    Ankle pain, right    Anxiety    Asthma    Diverticulitis    Fibula fracture  right  Hordeolum externum of left upper eyelid    Nasal polyps    Sickle cell trait    Tibia fracture  right.    Past Surgical History:  S/P adenoidectomy  1982  S/P  section  X4; , , ,   S/P tubal ligation  .    Tobacco Screening:  · Core Measure Site	No	    Risk Assessment:   Present on Admission:  Deep Venous Thrombosis	no	  Pulmonary Embolus	no	    Heart Failure:  Does this patient have a history of or has been diagnosed with heart failure? no.    HIV Screen (per [facility] Department of Health, HIV screening must be offered to every individual between ages 13 and 64)	Offered and patient declined	      Physical Exam:  Physical Exam: Vital Signs Last 24 Hrs T(C): 36.9, Max: 36.9 (-16 @ 13:36) T(F): 98.4, Max: 98.4 (02-16 @ 13:36) HR: 101 (101 - 101) BP: 138/97 (138/97 - 138/97) BP(mean): -- RR: 18 (18 - 18) SpO2: 100% (100% - 100%)  Gen: AAOx3, NAD RLE: + Splint C/D/I; + TTP around foot/ankle Neg TTP Knee/Hip Brisk Cap Refill < 2 sec Decreased sensation to toes Compartments Soft + EHL/FHL, moving all toes	    All relevant radiology reviewed from the outpatient xrays obtained revealing a trimalleolar equivalent ankle fracture of the right side

## 2017-02-17 NOTE — PROGRESS NOTE ADULT - ASSESSMENT
52 YO F with right ankle fracture    Pain control  NWB RLE  NPO  IVF  Hold anticoagulation  Plan for OR today for ORIF

## 2017-02-17 NOTE — PROGRESS NOTE ADULT - ASSESSMENT
51y female admitted w/    *displaced fracture distal right fibula   - s/p ORIF  - pain control   - management as per Ortho    *asthma  - stable, continue home med    *dvt px

## 2017-02-18 LAB
ANION GAP SERPL CALC-SCNC: 10 MMOL/L — SIGNIFICANT CHANGE UP (ref 5–17)
BASOPHILS # BLD AUTO: 0.1 K/UL — SIGNIFICANT CHANGE UP (ref 0–0.2)
BASOPHILS NFR BLD AUTO: 0.6 % — SIGNIFICANT CHANGE UP (ref 0–2)
BUN SERPL-MCNC: 13 MG/DL — SIGNIFICANT CHANGE UP (ref 7–23)
CALCIUM SERPL-MCNC: 9.4 MG/DL — SIGNIFICANT CHANGE UP (ref 8.5–10.1)
CHLORIDE SERPL-SCNC: 102 MMOL/L — SIGNIFICANT CHANGE UP (ref 96–108)
CO2 SERPL-SCNC: 25 MMOL/L — SIGNIFICANT CHANGE UP (ref 22–31)
CREAT SERPL-MCNC: 0.58 MG/DL — SIGNIFICANT CHANGE UP (ref 0.5–1.3)
EOSINOPHIL # BLD AUTO: 0 K/UL — SIGNIFICANT CHANGE UP (ref 0–0.5)
EOSINOPHIL NFR BLD AUTO: 0.1 % — SIGNIFICANT CHANGE UP (ref 0–6)
GLUCOSE SERPL-MCNC: 82 MG/DL — SIGNIFICANT CHANGE UP (ref 70–99)
HCT VFR BLD CALC: 34.6 % — SIGNIFICANT CHANGE UP (ref 34.5–45)
HGB BLD-MCNC: 11.7 G/DL — SIGNIFICANT CHANGE UP (ref 11.5–15.5)
LYMPHOCYTES # BLD AUTO: 1.7 K/UL — SIGNIFICANT CHANGE UP (ref 1–3.3)
LYMPHOCYTES # BLD AUTO: 16.1 % — SIGNIFICANT CHANGE UP (ref 13–44)
MCHC RBC-ENTMCNC: 30.9 PG — SIGNIFICANT CHANGE UP (ref 27–34)
MCHC RBC-ENTMCNC: 33.7 GM/DL — SIGNIFICANT CHANGE UP (ref 32–36)
MCV RBC AUTO: 91.8 FL — SIGNIFICANT CHANGE UP (ref 80–100)
MONOCYTES # BLD AUTO: 0.6 K/UL — SIGNIFICANT CHANGE UP (ref 0–0.9)
MONOCYTES NFR BLD AUTO: 5.5 % — SIGNIFICANT CHANGE UP (ref 2–14)
NEUTROPHILS # BLD AUTO: 8.2 K/UL — HIGH (ref 1.8–7.4)
NEUTROPHILS NFR BLD AUTO: 77.7 % — HIGH (ref 43–77)
PLATELET # BLD AUTO: 287 K/UL — SIGNIFICANT CHANGE UP (ref 150–400)
POTASSIUM SERPL-MCNC: 4.1 MMOL/L — SIGNIFICANT CHANGE UP (ref 3.5–5.3)
POTASSIUM SERPL-SCNC: 4.1 MMOL/L — SIGNIFICANT CHANGE UP (ref 3.5–5.3)
RBC # BLD: 3.77 M/UL — LOW (ref 3.8–5.2)
RBC # FLD: 13.3 % — SIGNIFICANT CHANGE UP (ref 10.3–14.5)
SODIUM SERPL-SCNC: 137 MMOL/L — SIGNIFICANT CHANGE UP (ref 135–145)
WBC # BLD: 10.5 K/UL — SIGNIFICANT CHANGE UP (ref 3.8–10.5)
WBC # FLD AUTO: 10.5 K/UL — SIGNIFICANT CHANGE UP (ref 3.8–10.5)

## 2017-02-18 RX ORDER — HYDROMORPHONE HYDROCHLORIDE 2 MG/ML
1 INJECTION INTRAMUSCULAR; INTRAVENOUS; SUBCUTANEOUS
Qty: 0 | Refills: 0 | Status: DISCONTINUED | OUTPATIENT
Start: 2017-02-18 | End: 2017-02-20

## 2017-02-18 RX ORDER — IPRATROPIUM/ALBUTEROL SULFATE 18-103MCG
3 AEROSOL WITH ADAPTER (GRAM) INHALATION EVERY 6 HOURS
Qty: 0 | Refills: 0 | Status: DISCONTINUED | OUTPATIENT
Start: 2017-02-18 | End: 2017-02-20

## 2017-02-18 RX ORDER — HYDROMORPHONE HYDROCHLORIDE 2 MG/ML
0.5 INJECTION INTRAMUSCULAR; INTRAVENOUS; SUBCUTANEOUS ONCE
Qty: 0 | Refills: 0 | Status: DISCONTINUED | OUTPATIENT
Start: 2017-02-18 | End: 2017-02-18

## 2017-02-18 RX ORDER — ALBUTEROL 90 UG/1
21 AEROSOL, METERED ORAL
Qty: 0 | Refills: 0 | Status: DISCONTINUED | OUTPATIENT
Start: 2017-02-18 | End: 2017-02-20

## 2017-02-18 RX ORDER — ONDANSETRON 8 MG/1
4 TABLET, FILM COATED ORAL EVERY 8 HOURS
Qty: 0 | Refills: 0 | Status: DISCONTINUED | OUTPATIENT
Start: 2017-02-18 | End: 2017-02-20

## 2017-02-18 RX ADMIN — OXYCODONE HYDROCHLORIDE 10 MILLIGRAM(S): 5 TABLET ORAL at 05:08

## 2017-02-18 RX ADMIN — HYDROMORPHONE HYDROCHLORIDE 0.5 MILLIGRAM(S): 2 INJECTION INTRAMUSCULAR; INTRAVENOUS; SUBCUTANEOUS at 10:17

## 2017-02-18 RX ADMIN — ENOXAPARIN SODIUM 40 MILLIGRAM(S): 100 INJECTION SUBCUTANEOUS at 11:19

## 2017-02-18 RX ADMIN — OXYCODONE HYDROCHLORIDE 10 MILLIGRAM(S): 5 TABLET ORAL at 12:18

## 2017-02-18 RX ADMIN — HYDROMORPHONE HYDROCHLORIDE 0.5 MILLIGRAM(S): 2 INJECTION INTRAMUSCULAR; INTRAVENOUS; SUBCUTANEOUS at 11:19

## 2017-02-18 RX ADMIN — HYDROMORPHONE HYDROCHLORIDE 1 MILLIGRAM(S): 2 INJECTION INTRAMUSCULAR; INTRAVENOUS; SUBCUTANEOUS at 22:47

## 2017-02-18 RX ADMIN — OXYCODONE HYDROCHLORIDE 10 MILLIGRAM(S): 5 TABLET ORAL at 20:13

## 2017-02-18 RX ADMIN — Medication 100 MILLIGRAM(S): at 02:59

## 2017-02-18 RX ADMIN — HYDROMORPHONE HYDROCHLORIDE 1 MILLIGRAM(S): 2 INJECTION INTRAMUSCULAR; INTRAVENOUS; SUBCUTANEOUS at 23:35

## 2017-02-18 RX ADMIN — HYDROMORPHONE HYDROCHLORIDE 1 MILLIGRAM(S): 2 INJECTION INTRAMUSCULAR; INTRAVENOUS; SUBCUTANEOUS at 18:09

## 2017-02-18 RX ADMIN — OXYCODONE HYDROCHLORIDE 10 MILLIGRAM(S): 5 TABLET ORAL at 21:13

## 2017-02-18 RX ADMIN — OXYCODONE HYDROCHLORIDE 10 MILLIGRAM(S): 5 TABLET ORAL at 16:13

## 2017-02-18 NOTE — PROGRESS NOTE ADULT - SUBJECTIVE AND OBJECTIVE BOX
Patient seen and examined. Pain controlled. Required 2 blocks post op for pain control.    Allergies    aspirin (Short breath)    Vital Signs Last 24 Hrs  T(C): 36.7, Max: 37.1 (02-17 @ 16:05)  T(F): 98, Max: 98.7 (02-17 @ 16:05)  HR: 72 (72 - 82)  BP: 136/80 (136/80 - 166/95)  BP(mean): --  RR: 18 (14 - 18)  SpO2: 99% (98% - 100%)    Physical Exam  Gen: NAD  RLE:   Splint c/d/i  No motor function currently- likely secondary to blocks (will reassess in AM)  SILT toes and 1st webspace  Cap refill brisk  Compartments soft  No pain w/ passive stretch of digits    A/P: 51y Female sp R ankle ORIF POD 0  Pain control  DVT ppx  PT/NWB RLE/OOB/Ambulate with crutches  FU labs  Dispo planning- home when cleared by PT  D/w attending Patient seen and examined. Pain controlled. Required 2 blocks post op for pain control.    Allergies    aspirin (Short breath)    Vital Signs Last 24 Hrs  T(C): 36.7, Max: 37.1 (02-17 @ 16:05)  T(F): 98, Max: 98.7 (02-17 @ 16:05)  HR: 72 (72 - 82)  BP: 136/80 (136/80 - 166/95)  BP(mean): --  RR: 18 (14 - 18)  SpO2: 99% (98% - 100%)    Physical Exam  Gen: NAD  RLE:   Splint c/d/i  No motor/sensory function currently- likely secondary to blocks (will reassess in AM)  Cap refill brisk  Compartments soft  No pain w/ passive stretch of digits    A/P: 51y Female sp R ankle ORIF POD 0  Pain control  DVT ppx  PT/NWB RLE/OOB/Ambulate with crutches  FU labs  Dispo planning- home when cleared by PT  D/w attending

## 2017-02-18 NOTE — PROGRESS NOTE ADULT - SUBJECTIVE AND OBJECTIVE BOX
2/18: Pt seen and examined. Pain improved and sensory returning.  No events to report.    51yF presents to  ED c/o Right Ankle Pain. 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 Was seen as outpatient and indicated for surgery to fix the right ankle fracture. Pt now presents today in significant amount of pain 2/2 to another fall earlier today while in the shower. Currently C/o pain in Right Foot/Ankle. States that she has slight decrease in feeling in her foot. Denies any other numbness, tingling, burning.  Denies any other injuries at current time. Pt    Vital Signs Last 24 Hrs  T(C): 37.3, Max: 37.3 (02-18 @ 07:18)  T(F): 99.2, Max: 99.2 (02-18 @ 07:18)  HR: 82 (72 - 92)  BP: 150/80 (136/80 - 166/95)  BP(mean): --  RR: 18 (14 - 18)  SpO2: 100% (97% - 100%)                          11.7   10.5  )-----------( 287      ( 18 Feb 2017 06:16 )             34.6   18 Feb 2017 06:16    137    |  102    |  13     ----------------------------<  82     4.1     |  25     |  0.58     Ca    9.4        18 Feb 2017 06:16    TPro  8.4    /  Alb  4.0    /  TBili  0.2    /  DBili  x      /  AST  33     /  ALT  24     /  AlkPhos  99     16 Feb 2017 20:10    Physical Exam  Gen: NAD  RLE:   Splint c/d/i  Able to wiggle toes  Sensation intact to toes and 1st DWS  Cap refill brisk  No pain w/ passive stretch of digits

## 2017-02-18 NOTE — PROGRESS NOTE ADULT - ASSESSMENT
51F with right ankle trimalleolar ankle fracture POD #1 s/p ORIF    -Pain control  -NWB right leg  -PT  -DVT ppx  -F/U with Dr. Bella @ [phone]

## 2017-02-18 NOTE — PROGRESS NOTE ADULT - SUBJECTIVE AND OBJECTIVE BOX
Patient seen and examined. Pain controlled. Required 2 blocks post op for pain control.    Allergies    aspirin (Short breath)    Vital Signs Last 24 Hrs  T(C): 36.7, Max: 37.1 (02-17 @ 16:05)  T(F): 98, Max: 98.7 (02-17 @ 16:05)  HR: 72 (72 - 82)  BP: 136/80 (136/80 - 166/95)  BP(mean): --  RR: 18 (14 - 18)  SpO2: 99% (98% - 100%)    Physical Exam  Gen: NAD  RLE:   Splint c/d/i  Remains unable to wiggle toes  Sensation intact above ankle joint, no sensation in toes at this time  Cap refill brisk  Compartments soft  No pain w/ passive stretch of digits    A/P: 51y Female sp R ankle ORIF POD 1  Pain control  DVT ppx  PT/NWB RLE/OOB/Ambulate with crutches  Splint loosened, ace removed- will continue to monitor for return of motor/sensory in distal RLE  FU labs  Dispo planning- home when cleared by PT  D/w attending

## 2017-02-18 NOTE — PHYSICAL THERAPY INITIAL EVALUATION ADULT - ADDITIONAL COMMENTS
Pt. lives in an apartment alone in [place]. 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

## 2017-02-18 NOTE — PHYSICAL THERAPY INITIAL EVALUATION ADULT - PERTINENT HX OF CURRENT PROBLEM, REHAB EVAL
Pt. fractured R ankle ~3 weeks ago while at work. Pt. fell in shower and presents w/ increasing pain in R ankle.

## 2017-02-18 NOTE — PROGRESS NOTE ADULT - SUBJECTIVE AND OBJECTIVE BOX
PCP - NONE    CHIEF COMPLAINT:   right ankle pain    HISTORY OF THE PRESENT ILLNESS:  51 F with Hx of Asthma (intubated 3x in past, last 1990s) sustained a fall at work about 3 weeks ago and fractured her right ankle.  She was evaluated by Dr. Bella in the office and was told that she needed surgery to repair the fracture.  While in the shower today, she had another fall and developed increasing pain in the right ankle.  She came to the ED for an evaluation.  At the time of the fall at work, she had no shortness of breath, chest pain, pleuritic pain, dizziness, palpatations, lightheadedness.    2/17- seen and examined this morning.  No complaints.   2/18 - patient c/o a lot of pain in the RLE. No chest pain, dyspnea or wheezing.     10point ROS negative.  Vital Signs Last 24 Hrs  T(C): 37.3, Max: 37.3 (02-18 @ 07:18)  T(F): 99.2, Max: 99.2 (02-18 @ 07:18)  HR: 82 (72 - 92)  BP: 150/80 (136/80 - 166/95)  BP(mean): --  RR: 18 (14 - 18)  SpO2: 100% (97% - 100%)    PHYSICAL EXAM:  General: NAD, comfortable  Neuro: AAOx3, no focal deficits  HEENT: NCAT  Neck: supple  Respiratory: CTA b/l, no w/r/r  Cardiovascular: S1 and S2, RRR, no m/g/r  Gastrointestinal: +BS, soft, NTND, no rebound/guarding  Extremities: No c/c/e, rt ankle dressing  Vascular: 2+ peripheral pulses       Lab Results:  CBC  CBC Full  -  ( 18 Feb 2017 06:16 )  WBC Count : 10.5 K/uL  Hemoglobin : 11.7 g/dL  Hematocrit : 34.6 %  Platelet Count - Automated : 287 K/uL  Mean Cell Volume : 91.8 fl  Mean Cell Hemoglobin : 30.9 pg  Mean Cell Hemoglobin Concentration : 33.7 gm/dL  Auto Neutrophil # : 8.2 K/uL  Auto Lymphocyte # : 1.7 K/uL  Auto Monocyte # : 0.6 K/uL  Auto Eosinophil # : 0.0 K/uL  Auto Basophil # : 0.1 K/uL  Auto Neutrophil % : 77.7 %  Auto Lymphocyte % : 16.1 %  Auto Monocyte % : 5.5 %  Auto Eosinophil % : 0.1 %  Auto Basophil % : 0.6 %    .		Differential:	[] Automated		[] Manual  Chemistry                        11.7   10.5  )-----------( 287      ( 18 Feb 2017 06:16 )             34.6     18 Feb 2017 06:16    137    |  102    |  13     ----------------------------<  82     4.1     |  25     |  0.58     Ca    9.4        18 Feb 2017 06:16    TPro  8.4    /  Alb  4.0    /  TBili  0.2    /  DBili  x      /  AST  33     /  ALT  24     /  AlkPhos  99     16 Feb 2017 20:10    LIVER FUNCTIONS - ( 16 Feb 2017 20:10 )  Alb: 4.0 g/dL / Pro: 8.4 gm/dL / ALK PHOS: 99 U/L / ALT: 24 U/L / AST: 33 U/L / GGT: x           PT/INR - ( 17 Feb 2017 06:51 )   PT: 13.4 sec;   INR: 1.22 ratio         PTT - ( 17 Feb 2017 06:51 )  PTT:30.8 sec      RADIOLOGY RESULTS:       EKG - NSR, LAD, LVH, normal RWP, atrial enlargement, no acute ischemic changes, QTc 428, no old EKG for comparison      RADIOLOGY:  EXAM:  CR TIB-FIB RIGHT                          PROCEDURE DATE:  02/16/2017      There is a cast overlying fracture of distal right fibula. There is   minimal posterior displacement of distal fracture fragment with   relationship to the proximal fracture fragment. Soft tissues are grossly   intact.      EXAM:  CHEST P.A. LATERAL                        PROCEDURE DATE:  02/14/2017    The lungs are of equal and symmetric in aeration. The bronchovascular   markings are unremarkable.  There is no acute parenchymal consolidation.    There is no pleural effusion. The cardiomediastinal silhouette is within   normal limits.  There is no acute osseous finding.  IMPRESSION:  Clear lungs.

## 2017-02-18 NOTE — PROGRESS NOTE ADULT - SUBJECTIVE AND OBJECTIVE BOX
Patient seen and examined. Pain controlled. Block starting to wear off.    Allergies    aspirin (Short breath)    Vital Signs Last 24 Hrs  T(C): 37.3, Max: 37.3 (02-18 @ 07:18)  T(F): 99.2, Max: 99.2 (02-18 @ 07:18)  HR: 82 (72 - 92)  BP: 150/80 (136/80 - 166/95)  BP(mean): --  RR: 18 (14 - 18)  SpO2: 100% (97% - 100%)    Physical Exam  Gen: NAD  RLE:   Splint c/d/i  Able to wiggle toes  Sensation improving  Cap refill brisk  Compartments soft  No pain w/ passive stretch of digits    A/P: 51y Female sp R ankle ORIF POD 1  Pain control  DVT ppx  PT/NWB RLE/OOB/Ambulate with crutches  Monitor for return to baseline sensation  Dispo planning- home when cleared by PT  D/w attending

## 2017-02-18 NOTE — PROGRESS NOTE ADULT - ASSESSMENT
51y female admitted w/    *displaced fracture distal right fibula   - s/p ORIF (2/17)  - pain control   - management as per Ortho  - DVT ppx  - PT/NWB RLE/OOB/Ambulate with crutches  - Dispo planning- home when cleared by PT    *asthma  - stable, continue home med    *dvt px     Thank you for this courtesy consult

## 2017-02-18 NOTE — PHYSICAL THERAPY INITIAL EVALUATION ADULT - ACTIVE RANGE OF MOTION EXAMINATION, REHAB EVAL
bilateral  lower extremity Active ROM was WFL (within functional limits)/except R ankle not tested due to cast/bilateral upper extremity Active ROM was WFL (within functional limits)

## 2017-02-19 ENCOUNTER — TRANSCRIPTION ENCOUNTER (OUTPATIENT)
Age: 52
End: 2017-02-19

## 2017-02-19 LAB
ANION GAP SERPL CALC-SCNC: 8 MMOL/L — SIGNIFICANT CHANGE UP (ref 5–17)
BUN SERPL-MCNC: 11 MG/DL — SIGNIFICANT CHANGE UP (ref 7–23)
CALCIUM SERPL-MCNC: 9.3 MG/DL — SIGNIFICANT CHANGE UP (ref 8.5–10.1)
CHLORIDE SERPL-SCNC: 102 MMOL/L — SIGNIFICANT CHANGE UP (ref 96–108)
CO2 SERPL-SCNC: 27 MMOL/L — SIGNIFICANT CHANGE UP (ref 22–31)
CREAT SERPL-MCNC: 0.53 MG/DL — SIGNIFICANT CHANGE UP (ref 0.5–1.3)
GLUCOSE SERPL-MCNC: 95 MG/DL — SIGNIFICANT CHANGE UP (ref 70–99)
POTASSIUM SERPL-MCNC: 3.9 MMOL/L — SIGNIFICANT CHANGE UP (ref 3.5–5.3)
POTASSIUM SERPL-SCNC: 3.9 MMOL/L — SIGNIFICANT CHANGE UP (ref 3.5–5.3)
SODIUM SERPL-SCNC: 137 MMOL/L — SIGNIFICANT CHANGE UP (ref 135–145)

## 2017-02-19 RX ORDER — FAMOTIDINE 10 MG/ML
1 INJECTION INTRAVENOUS
Qty: 30 | Refills: 0 | OUTPATIENT
Start: 2017-02-19 | End: 2017-03-21

## 2017-02-19 RX ORDER — DOCUSATE SODIUM 100 MG
1 CAPSULE ORAL
Qty: 60 | Refills: 0 | OUTPATIENT
Start: 2017-02-19

## 2017-02-19 RX ORDER — ENOXAPARIN SODIUM 100 MG/ML
40 INJECTION SUBCUTANEOUS
Qty: 30 | Refills: 0 | OUTPATIENT
Start: 2017-02-19 | End: 2017-03-21

## 2017-02-19 RX ADMIN — ALBUTEROL 2 PUFF(S): 90 AEROSOL, METERED ORAL at 21:43

## 2017-02-19 RX ADMIN — OXYCODONE HYDROCHLORIDE 10 MILLIGRAM(S): 5 TABLET ORAL at 20:40

## 2017-02-19 RX ADMIN — HYDROMORPHONE HYDROCHLORIDE 1 MILLIGRAM(S): 2 INJECTION INTRAMUSCULAR; INTRAVENOUS; SUBCUTANEOUS at 04:46

## 2017-02-19 RX ADMIN — OXYCODONE HYDROCHLORIDE 10 MILLIGRAM(S): 5 TABLET ORAL at 23:44

## 2017-02-19 RX ADMIN — HYDROMORPHONE HYDROCHLORIDE 1 MILLIGRAM(S): 2 INJECTION INTRAMUSCULAR; INTRAVENOUS; SUBCUTANEOUS at 06:50

## 2017-02-19 RX ADMIN — OXYCODONE HYDROCHLORIDE 10 MILLIGRAM(S): 5 TABLET ORAL at 09:43

## 2017-02-19 RX ADMIN — OXYCODONE HYDROCHLORIDE 10 MILLIGRAM(S): 5 TABLET ORAL at 19:40

## 2017-02-19 RX ADMIN — ALBUTEROL 21 PUFF(S): 90 AEROSOL, METERED ORAL at 08:17

## 2017-02-19 RX ADMIN — OXYCODONE HYDROCHLORIDE 10 MILLIGRAM(S): 5 TABLET ORAL at 15:04

## 2017-02-19 RX ADMIN — ENOXAPARIN SODIUM 40 MILLIGRAM(S): 100 INJECTION SUBCUTANEOUS at 13:22

## 2017-02-19 NOTE — DISCHARGE NOTE ADULT - CARE PLAN
Principal Discharge DX:	Fibula fracture  Goal:	Return to ADLs  Instructions for follow-up, activity and diet:	1.	Pain Control  2.	Non-Weight Bearing  Right Lower Extremity, with assistive devices as needed  3.	DVT Prophylaxis  4.	PT as needed  5.	Follow up with Dr. Bella as Outpatient in 10-14 Days after Discharge from the Hospital or Rehab. Call Office For Appointment.  6.	 Staples/Sutures to be removed  Post-Op Day 14, and repeat x-rays  7.	Ice/Elevate affected area as Needed  8.	Keep Splint Clean and dry.

## 2017-02-19 NOTE — DISCHARGE NOTE ADULT - HOSPITAL COURSE
The patient is a 51 year old female status post Open Reduction Surgical Fixation of a right ankle Fracture after being admitted through [facility] Emergency Room. The Patient was medically Optimized for the Previously mentioned surgical procedure. The patient was taken to the operating room on date mentioned above. Prophylactic antibiotics were started before the procedure and continued for 24 hours.  There were no complications during the procedure and patient tolerated the procedure well.  The patient was transferred to recovery room in stable condition and subsequently to surgical floor.  Patient was placed on Lovenox for anticoagulation.  All home medications were continued.  The patient received physical therapy daily and daily labs were followed.  The dressing Splint as kept clean, dry, intact.  The rest of the hospital stay was unremarkable.*The patient was discharged in stable condition to follow up as outpatient. The patient is a 51 year old female status post Open Reduction Surgical Fixation of a right ankle Fracture after being admitted through [facility] Emergency Room. The Patient was medically Optimized for the Previously mentioned surgical procedure. The patient was taken to the operating room on date mentioned above. Prophylactic antibiotics were started before the procedure and continued for 24 hours.  There were no complications during the procedure and patient tolerated the procedure well.  The patient was transferred to recovery room in stable condition and subsequently to surgical floor.  Patient was placed on Lovenox for anticoagulation.  All home medications were continued.  The patient received physical therapy daily and daily labs were followed.  The dressing Splint as kept clean, dry, intact.  The rest of the hospital stay was unremarkable. The patient was discharged in stable condition to follow up as outpatient.

## 2017-02-19 NOTE — DISCHARGE NOTE ADULT - PATIENT PORTAL LINK FT
“You can access the [facility] Patient Portal, offered by [facility], by registering with the following website: [email or web address]”

## 2017-02-19 NOTE — PROGRESS NOTE ADULT - SUBJECTIVE AND OBJECTIVE BOX
2/19: Pt seen and examined. No new events.  Doing well.  Pain controlled.      2/18: Pt seen and examined. Pain improved and sensory returning.  No events to report.                          11.7   10.5  )-----------( 287      ( 18 Feb 2017 06:16 )             34.6   19 Feb 2017 06:24    137    |  102    |  11     ----------------------------<  95     3.9     |  27     |  0.53     Ca    9.3        19 Feb 2017 06:24        Physical Exam  Gen: NAD  RLE:   Splint c/d/i  Able to wiggle toes  Sensation intact to toes and 1st DWS  Cap refill brisk  No pain w/ passive stretch of digits

## 2017-02-19 NOTE — DISCHARGE NOTE ADULT - CARE PROVIDER_API CALL
Bridger Bella (DO), Orthopedics  [address] [place] [address]  Phone: [phone]  Fax: [phone]

## 2017-02-19 NOTE — DISCHARGE NOTE ADULT - PLAN OF CARE
Return to ADLs 1.	Pain Control  2.	Non-Weight Bearing  Right Lower Extremity, with assistive devices as needed  3.	DVT Prophylaxis  4.	PT as needed  5.	Follow up with Dr. Bella as Outpatient in 10-14 Days after Discharge from the Hospital or Rehab. Call Office For Appointment.  6.	 Staples/Sutures to be removed  Post-Op Day 14, and repeat x-rays  7.	Ice/Elevate affected area as Needed  8.	Keep Splint Clean and dry.

## 2017-02-19 NOTE — DISCHARGE NOTE ADULT - MEDICATION SUMMARY - MEDICATIONS TO TAKE
I will START or STAY ON the medications listed below when I get home from the hospital:    oxyCODONE-acetaminophen 5 mg-325 mg oral tablet  -- 1 tab(s) by mouth every 4 hours, As Needed -for severe pain MDD:6  -- Caution federal law prohibits the transfer of this drug to any person other  than the person for whom it was prescribed.  May cause drowsiness.  Alcohol may intensify this effect.  Use care when operating dangerous machinery.  This prescription cannot be refilled.  This product contains acetaminophen.  Do not use  with any other product containing acetaminophen to prevent possible liver damage.  Using more of this medication than prescribed may cause serious breathing problems.    -- Indication: For pain    enoxaparin 40 mg/0.4 mL injectable solution  -- 40 milligram(s) injectable once a day for dvt ppx  -- It is very important that you take or use this exactly as directed.  Do not skip doses or discontinue unless directed by your doctor.    -- Indication: For Dvt ppx    Zofran 4 mg oral tablet  -- 1 tab(s) by mouth every 6 hours, As Needed -for nausea  -- Indication: For nausea    Ventolin HFA 90 mcg/inh inhalation aerosol  -- 2 puff(s) inhaled 4 times a day, As Needed  -- Indication: For prior med    Keflex 500 mg oral capsule  -- 1 cap(s) by mouth 3 times a day anc ppx  -- Finish all this medication unless otherwise directed by prescriber.    -- Indication: For prior med    Pepcid 20 mg oral tablet  -- 1 tab(s) by mouth once a day for GI ppx  -- It is very important that you take or use this exactly as directed.  Do not skip doses or discontinue unless directed by your doctor.  Obtain medical advice before taking any non-prescription drugs as some may affect the action of this medication.    -- Indication: For prior med    Colace 100 mg oral capsule  -- 1 cap(s) by mouth once a day, As Needed  -- Indication: For prior med    Colace 100 mg oral capsule  -- 1 cap(s) by mouth 3 times a day  -- Medication should be taken with plenty of water.    -- Indication: For prior med

## 2017-02-19 NOTE — PROGRESS NOTE ADULT - ASSESSMENT
51F with right ankle trimalleolar ankle fracture POD #2 s/p ORIF    -Pain control  -NWB right leg  -PT  -DVT ppx - Anticoagulation recommendations pending.  Appreciate their input.   -F/U with Dr. Bella @ [phone]

## 2017-02-19 NOTE — PROGRESS NOTE ADULT - SUBJECTIVE AND OBJECTIVE BOX
PCP - NONE    CHIEF COMPLAINT:   right ankle pain    HISTORY OF THE PRESENT ILLNESS:  51 F with Hx of Asthma (intubated 3x in past, last 1990s) sustained a fall at work about 3 weeks ago and fractured her right ankle.  She was evaluated by Dr. Bella in the office and was told that she needed surgery to repair the fracture.  While in the shower today, she had another fall and developed increasing pain in the right ankle.  She came to the ED for an evaluation.  At the time of the fall at work, she had no shortness of breath, chest pain, pleuritic pain, dizziness, palpatations, lightheadedness.    2/17- seen and examined this morning.  No complaints.   2/18 - patient c/o a lot of pain in the RLE. No chest pain, dyspnea or wheezing.   2/19 - Pain is better today with Pain meds    10point ROS negative.  ICU Vital Signs Last 24 Hrs  T(C): 36.8, Max: 36.8 (02-19 @ 07:25)  T(F): 98.3, Max: 98.3 (02-19 @ 07:25)  HR: 107 (75 - 107)  BP: 142/78 (142/78 - 149/89)  BP(mean): --  ABP: --  ABP(mean): --  RR: 18 (18 - 18)  SpO2: 97% (96% - 100%)    PHYSICAL EXAM:  General: NAD, comfortable  Neuro: AAOx3, no focal deficits  HEENT: NCAT  Neck: supple  Respiratory: CTA b/l, no w/r/r  Cardiovascular: S1 and S2, RRR, no m/g/r  Gastrointestinal: +BS, soft, NTND, no rebound/guarding  Extremities: No c/c/e, rt ankle dressing  Vascular: 2+ peripheral pulses       Lab Results:  Lab Results:  CBC  CBC Full  -  ( 18 Feb 2017 06:16 )  WBC Count : 10.5 K/uL  Hemoglobin : 11.7 g/dL  Hematocrit : 34.6 %  Platelet Count - Automated : 287 K/uL  Mean Cell Volume : 91.8 fl  Mean Cell Hemoglobin : 30.9 pg  Mean Cell Hemoglobin Concentration : 33.7 gm/dL  Auto Neutrophil # : 8.2 K/uL  Auto Lymphocyte # : 1.7 K/uL  Auto Monocyte # : 0.6 K/uL  Auto Eosinophil # : 0.0 K/uL  Auto Basophil # : 0.1 K/uL  Auto Neutrophil % : 77.7 %  Auto Lymphocyte % : 16.1 %  Auto Monocyte % : 5.5 %  Auto Eosinophil % : 0.1 %  Auto Basophil % : 0.6 %    .		Differential:	[] Automated		[] Manual  Chemistry                        11.7   10.5  )-----------( 287      ( 18 Feb 2017 06:16 )             34.6     19 Feb 2017 06:24    137    |  102    |  11     ----------------------------<  95     3.9     |  27     |  0.53     Ca    9.3        19 Feb 2017 06:24        EKG - NSR, LAD, LVH, normal RWP, atrial enlargement, no acute ischemic changes, QTc 428, no old EKG for comparison      RADIOLOGY:  EXAM:  CR TIB-FIB RIGHT                          PROCEDURE DATE:  02/16/2017      There is a cast overlying fracture of distal right fibula. There is   minimal posterior displacement of distal fracture fragment with   relationship to the proximal fracture fragment. Soft tissues are grossly   intact.      EXAM:  CHEST P.A. LATERAL                        PROCEDURE DATE:  02/14/2017    The lungs are of equal and symmetric in aeration. The bronchovascular   markings are unremarkable.  There is no acute parenchymal consolidation.    There is no pleural effusion. The cardiomediastinal silhouette is within   normal limits.  There is no acute osseous finding.  IMPRESSION:  Clear lungs.

## 2017-02-19 NOTE — PROGRESS NOTE ADULT - SUBJECTIVE AND OBJECTIVE BOX
Patient seen and examined. Pain controlled. Block starting to wear off.    Allergies    aspirin (Short breath)    Vital Signs Last 24 Hrs  T(C): 36.8, Max: 36.8 (02-19 @ 07:25)  T(F): 98.3, Max: 98.3 (02-19 @ 07:25)  HR: 88 (75 - 99)  BP: 142/78 (142/78 - 165/90)  BP(mean): --  RR: 18 (18 - 18)  SpO2: 96% (96% - 100%)    Physical Exam  Gen: NAD  RLE:   Splint c/d/i  Able to wiggle toes  Sensation improving  Cap refill brisk  Compartments soft  No pain w/ passive stretch of digits    A/P: 51y Female sp R ankle ORIF POD 2  Pain control  DVT ppx  PT/NWB RLE/OOB/Ambulate with crutches  Monitor for return to baseline sensation  Dispo planning- home when cleared by PT  D/w attending

## 2017-02-20 VITALS — HEIGHT: 65 IN

## 2017-02-20 LAB
ANION GAP SERPL CALC-SCNC: 9 MMOL/L — SIGNIFICANT CHANGE UP (ref 5–17)
BUN SERPL-MCNC: 16 MG/DL — SIGNIFICANT CHANGE UP (ref 7–23)
CALCIUM SERPL-MCNC: 9 MG/DL — SIGNIFICANT CHANGE UP (ref 8.5–10.1)
CHLORIDE SERPL-SCNC: 104 MMOL/L — SIGNIFICANT CHANGE UP (ref 96–108)
CO2 SERPL-SCNC: 26 MMOL/L — SIGNIFICANT CHANGE UP (ref 22–31)
CREAT SERPL-MCNC: 0.58 MG/DL — SIGNIFICANT CHANGE UP (ref 0.5–1.3)
GLUCOSE SERPL-MCNC: 102 MG/DL — HIGH (ref 70–99)
POTASSIUM SERPL-MCNC: 3.9 MMOL/L — SIGNIFICANT CHANGE UP (ref 3.5–5.3)
POTASSIUM SERPL-SCNC: 3.9 MMOL/L — SIGNIFICANT CHANGE UP (ref 3.5–5.3)
SODIUM SERPL-SCNC: 139 MMOL/L — SIGNIFICANT CHANGE UP (ref 135–145)

## 2017-02-20 RX ORDER — ONDANSETRON 8 MG/1
1 TABLET, FILM COATED ORAL
Qty: 56 | Refills: 0 | OUTPATIENT
Start: 2017-02-20

## 2017-02-20 RX ORDER — ENOXAPARIN SODIUM 100 MG/ML
40 INJECTION SUBCUTANEOUS
Qty: 30 | Refills: 0 | OUTPATIENT
Start: 2017-02-20 | End: 2017-03-22

## 2017-02-20 RX ORDER — CEPHALEXIN 500 MG
1 CAPSULE ORAL
Qty: 21 | Refills: 0 | OUTPATIENT
Start: 2017-02-20 | End: 2017-02-27

## 2017-02-20 RX ADMIN — OXYCODONE HYDROCHLORIDE 10 MILLIGRAM(S): 5 TABLET ORAL at 08:59

## 2017-02-20 RX ADMIN — OXYCODONE HYDROCHLORIDE 10 MILLIGRAM(S): 5 TABLET ORAL at 09:45

## 2017-02-20 RX ADMIN — OXYCODONE HYDROCHLORIDE 10 MILLIGRAM(S): 5 TABLET ORAL at 00:30

## 2017-02-20 RX ADMIN — OXYCODONE HYDROCHLORIDE 10 MILLIGRAM(S): 5 TABLET ORAL at 04:34

## 2017-02-20 RX ADMIN — OXYCODONE HYDROCHLORIDE 10 MILLIGRAM(S): 5 TABLET ORAL at 06:01

## 2017-02-20 RX ADMIN — ENOXAPARIN SODIUM 40 MILLIGRAM(S): 100 INJECTION SUBCUTANEOUS at 13:48

## 2017-02-20 RX ADMIN — ALBUTEROL 21 PUFF(S): 90 AEROSOL, METERED ORAL at 08:25

## 2017-02-20 NOTE — CONSULT NOTE ADULT - SUBJECTIVE AND OBJECTIVE BOX
HPI:  51yF presents to  ED c/o Right Ankle Pain. 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 Was seen as outpatient by Dr. Bridger Bella who stated that she needs surgery to fix ankle fracture. Pt now presents today in significant amount of pain 2/2 to another fall earlier today while in the shower. Currently C/o pain in Right Foot/Ankle. States that she has slight decrease in feeling in her foot. Denies any other numbness, tingling, burning.  Denies any other injuries at current time. Pt    All: Aspirin  PMH: Asthma  PSH: C Section x 4  Meds: Ventolin (2017 18:45)      Patient is a 51y old  Female who presents with a chief complaint of Right ankle fracture (2017 21:57)  S/P ON 2017 right lateral mal ORIF with tight rope      Consulted by Dr. Bella for VTE prophylaxis, risk stratification, and anticoagulation management.    PAST MEDICAL & SURGICAL HISTORY:  Hordeolum externum of left upper eyelid  Nasal polyps  Anemia  Sickle cell trait  Anxiety  Ankle pain, right  Fibula fracture: right  Tibia fracture: right  Diverticulitis  Asthma  S/P adenoidectomy:   S/P tubal ligation:   S/P  section: X4; , , ,       Caprini VTE Risk Score:4     IMPROVE Bleeding Risk Score: 1.5 low    Falls Risk:   High (  )  Mod ( x )  Low (  )    EBL:  50 ml  pain:" just had pain meds it is getting better"  17 pt seen at bedside oob in chair discussed her anticoagulation with Lovenox.  Pt states her son will give her the inj. Questions answered.  RN will teach.    Vital Signs Last 24 Hrs  T(C): 36.5, Max: 36.8 ( @ 23:26)  T(F): 97.7, Max: 98.3 ( @ 23:26)  HR: 101 (97 - 101)  BP: 136/84 (125/66 - 136/84)  BP(mean): --  RR: 18 (16 - 18)  SpO2: 100% (99% - 100%)  FAMILY HISTORY:    Denies any personal or familial history of clotting or bleeding disorders.    Allergies    aspirin (Short breath)    Intolerances    REVIEW OF SYSTEMS    (  )Fever	     (  )Constipation	(  )SOB				(  )Headache	(  )Dysuria  (  )Chills	     (  )Melena	(  )Dyspnea present on exertion	                    (  )Dizziness                    (  )Polyuria  (  )Nausea	     (  )Hematochezia	(  )Cough			                    (  )Syncope   	(  )Hematuria  (  )Vomiting    (  )Chest Pain	(  )Wheezing			(  )Weakness  (  )Diarrhea     (  )Palpitations	(  )Anorexia			(  )Myalgia    All other review of systems negative: Yes    PHYSICAL EXAM:    Constitutional: Appears Well    Neurological: A& O x 3    Skin: Warm    Respiratory and Thorax: normal effort; Breath sounds: normal; No rales/wheezing/rhonchi  	  Cardiovascular: S1, S2, regular, NMBR	    Gastrointestinal: BS + x 4Q, nontender	    Genitourinary:  Bladder nondistended, nontender    Musculoskeletal:   General Right:   no muscle/joint tenderness,   normal tone, no joint swelling,   ROM: limitedl	    General Left:   no muscle/joint tenderness,   normal tone, no joint swelling,   ROM: full    ankle  Right: cast noted, good capillary refill, + sensation             Lower extrems:   Right: no calf tenderness              negative ej's sign               + pedal pulses    Left:   no calf tenderness              negative ej's sign               + pedal pulses        2017 07:04    139    |  104    |  16     ----------------------------<  102    3.9     |  26     |  0.58     Ca    9.0        2017 07:04        				    MEDICATIONS  (STANDING):  lactated ringers. 1000milliLiter(s) IV Continuous <Continuous>  enoxaparin Injectable 40milliGRAM(s) SubCutaneous daily  ALBUTerol    90 MICROgram(s) HFA Inhaler 21Puff(s) Inhalation two times a day          DVT Prophylaxis:  LMWH                   (x  )  Heparin SQ           (  )  Coumadin             (  )  Xarelto                  (  )  Eliquis                   (  )  Venodynes           (x  )  Ambulation          (x  )  UFH                       (  )  Contraindicated  (  )

## 2017-02-20 NOTE — PROGRESS NOTE ADULT - SUBJECTIVE AND OBJECTIVE BOX
Patient seen and examined. Pain controlled. No issues overnight    Vital Signs Last 24 Hrs  T(C): 36.5, Max: 36.8 (02-19 @ 23:26)  T(F): 97.7, Max: 98.3 (02-19 @ 23:26)  HR: 101 (97 - 107)  BP: 136/84 (125/66 - 136/84)  BP(mean): --  RR: 18 (16 - 18)  SpO2: 100% (97% - 100%)    Physical Exam  Gen: NAD  RLE:   Splint c/d/i  Able to wiggle toes  Sensation improving  Cap refill brisk  Compartments soft  No pain w/ passive stretch of digits    A/P: 51y Female sp R ankle ORIF POD 3  Pain control  DVT ppx  PT/NWB RLE/OOB/Ambulate with crutches  Monitor for return to baseline sensation  Dispo planning- home today  D/w attending

## 2017-02-20 NOTE — PROGRESS NOTE ADULT - SUBJECTIVE AND OBJECTIVE BOX
Discharge Progress Note    Patient is s/p ORIF of the right distal fibula fracture.  She is to remain in a splint and be NWB for 6-8 weeks.  She will followup with Dr. Bella @ [address] [place] [address] within 2 weeks.  [phone].      Hospital course:  The patient is a 51 year old female status post Open Reduction Surgical Fixation of a right ankle Fracture after being admitted through [facility] Emergency Room. The Patient was medically Optimized for the Previously mentioned surgical procedure. The patient was taken to the operating room  for her procedure. Prophylactic antibiotics were started before the procedure and continued for 24 hours.  There were no complications during the procedure and patient tolerated the procedure well.  The patient was transferred to recovery room in stable condition and subsequently to surgical floor.  Patient was placed on Lovenox for anticoagulation.  All home medications were continued.  The patient received physical therapy daily and daily labs were followed.  The dressing Splint as kept clean, dry, intact.  The rest of the hospital stay was unremarkable. The patient was discharged in stable condition to follow up as outpatient.    I am the attending of record and have supervised the care provided from an orthopaedic surgery standpoint throughout this case.

## 2017-02-20 NOTE — PROGRESS NOTE ADULT - PROVIDER SPECIALTY LIST ADULT
Hospitalist
Orthopedics

## 2017-02-20 NOTE — CONSULT NOTE ADULT - ASSESSMENT
This is a 51 year old female s/p fall causing fx of her right ankle now s/p right ankle ORIF ON 1-17-17.  Pt is nwb for 4-6 weeks. Has high thrombosis risk.  Requires anticoagulation prophylaxis.  :lovenox 40mg sq daily for 4-6 weeks    : daily cbc/bmp  :mobility as per ortho.  : thanks for consult will f/u with pt This is a 51 year old female s/p fall causing fx of her right ankle now s/p right ankle ORIF ON 1-17-17.  Pt is nwb for 4-6 weeks. Has high thrombosis risk.  Requires anticoagulation prophylaxis.  :lovenox 40mg sq daily for 4-6 weeks   informed pt that pharmacy on file in [place] dose not have her ins information.  she states she will give them her information when she pics up meds.  : daily cbc/bmp  :mobility as per ortho.  : thanks for consult will f/u with pt

## 2017-02-20 NOTE — PROGRESS NOTE ADULT - SUBJECTIVE AND OBJECTIVE BOX
CHIEF COMPLAINT:  ankle fracture    SUBJECTIVE:   pain moderately controlled. ambulating/oob. ready for discharge with home health    REVIEW OF SYSTEMS:    CONSTITUTIONAL: No weakness, fevers or chills  EYES/ENT: No visual changes;  No vertigo or throat pain   NECK: No pain or stiffness  RESPIRATORY: No cough, wheezing, hemoptysis; No shortness of breath  CARDIOVASCULAR: No chest pain or palpitations  GASTROINTESTINAL: No abdominal or epigastric pain. No nausea, vomiting, or hematemesis; No diarrhea or constipation. No melena or hematochezia.  GENITOURINARY: No dysuria, frequency or hematuria  NEUROLOGICAL: No numbness or weakness  SKIN: No itching, burning, rashes, or lesions   All other review of systems is negative unless indicated above    Vital Signs Last 24 Hrs  T(C): 36.5, Max: 36.8 (02-19 @ 23:26)  T(F): 97.7, Max: 98.3 (02-19 @ 23:26)  HR: 101 (97 - 101)  BP: 136/84 (125/66 - 136/84)  BP(mean): --  RR: 18 (16 - 18)  SpO2: 100% (99% - 100%)    I&O's Summary    I & Os for current day (as of 20 Feb 2017 13:15)  =============================================  IN: 480 ml / OUT: 0 ml / NET: 480 ml      CAPILLARY BLOOD GLUCOSE      PHYSICAL EXAM:    Constitutional: NAD, awake and alert, well-developed  HEENT: PERR, EOMI, Normal Hearing, MMM  Neck: Soft and supple, No LAD, No JVD  Respiratory: Breath sounds are clear bilaterally, No wheezing, rales or rhonchi  Cardiovascular: S1 and S2, regular rate and rhythm, no Murmurs, gallops or rubs  Gastrointestinal: Bowel Sounds present, soft, nontender, nondistended, no guarding, no rebound  Extremities: No peripheral edema  Vascular: 2+ peripheral pulses  Neurological: A/O x 3, no focal deficits  Musculoskeletal: 5/5 strength b/l upper and lower extremities  Skin: No rashes    MEDICATIONS:  MEDICATIONS  (STANDING):  lactated ringers. 1000milliLiter(s) IV Continuous <Continuous>  enoxaparin Injectable 40milliGRAM(s) SubCutaneous daily  ALBUTerol    90 MICROgram(s) HFA Inhaler 21Puff(s) Inhalation two times a day      LABS: All Labs Reviewed:    20 Feb 2017 07:04    139    |  104    |  16     ----------------------------<  102    3.9     |  26     |  0.58     Ca    9.0        20 Feb 2017 07:04        51 F with Hx of Asthma (intubated 3x in past, last 1990s) sustained a fall at work about 3 weeks ago and fractured her right ankle.  She was evaluated by Dr. Bella in the office and was told that she needed surgery to repair the fracture. While in the shower today, she had another fall and developed increasing pain in the right ankle.  She came to the ED for an evaluation.  At the time of the fall at work, she had no shortness of breath, chest pain, pleuritic pain, dizziness, palpitations lightheadedness.    Pt was admitted for operative intervention of her fracture. medicine was involved for management of her comorbidities.    · Assessment		  51y female admitted w/    *displaced fracture distal right fibula   - s/p ORIF (2/17)  - pain control   - management as per Ortho  - DVT ppx  - PT/NWB RLE/OOB/Ambulate with crutches  - Dispo planning- home with home PT    *Mild intermittent asthma  - stable, continue PRN albuterol    *dvt px     Thank you for this courtesy consult. Medically stable for discharge. please call with further questions.    DISPOSITION:
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
## 2017-02-23 DIAGNOSIS — S82.831A OTHER FRACTURE OF UPPER AND LOWER END OF RIGHT FIBULA, INITIAL ENCOUNTER FOR CLOSED FRACTURE: ICD-10-CM

## 2017-02-23 DIAGNOSIS — D64.9 ANEMIA, UNSPECIFIED: ICD-10-CM
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
## 2017-02-23 DIAGNOSIS — S82.851A DISPLACED TRIMALLEOLAR FRACTURE OF RIGHT LOWER LEG, INITIAL ENCOUNTER FOR CLOSED FRACTURE: ICD-10-CM

## 2017-02-23 DIAGNOSIS — J45.909 UNSPECIFIED ASTHMA, UNCOMPLICATED: ICD-10-CM

## 2017-02-23 DIAGNOSIS — S82.851P: ICD-10-CM

## 2017-02-23 DIAGNOSIS — H00.014 HORDEOLUM EXTERNUM LEFT UPPER EYELID: ICD-10-CM
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
## 2017-02-23 DIAGNOSIS — D57.3 SICKLE-CELL TRAIT: ICD-10-CM

## 2017-02-23 DIAGNOSIS — Z88.8 ALLERGY STATUS TO OTHER DRUGS, MEDICAMENTS AND BIOLOGICAL SUBSTANCES: ICD-10-CM

## 2017-02-23 DIAGNOSIS — F41.9 ANXIETY DISORDER, UNSPECIFIED: ICD-10-CM

## 2017-02-24 ENCOUNTER — APPOINTMENT (OUTPATIENT)
Dept: ORTHOPEDIC SURGERY | Facility: CLINIC | Age: 52
End: 2017-02-24

## 2017-02-24 DIAGNOSIS — S82.851A DISPLACED TRIMALLEOLAR FRACTURE OF RIGHT LOWER LEG, INITIAL ENCOUNTER FOR CLOSED FRACTURE: ICD-10-CM

## 2017-02-26 PROBLEM — S82.851A DISPLACED TRIMALLEOLAR FRACTURE OF RIGHT LOWER LEG, INITIAL ENCOUNTER FOR CLOSED FRACTURE: Noted: 2017-02-09

## 2017-03-03 ENCOUNTER — APPOINTMENT (OUTPATIENT)
Dept: ORTHOPEDIC SURGERY | Facility: CLINIC | Age: 52
End: 2017-03-03

## 2017-03-20 ENCOUNTER — APPOINTMENT (OUTPATIENT)
Dept: ORTHOPEDIC SURGERY | Facility: CLINIC | Age: 52
End: 2017-03-20

## 2017-03-30 PROBLEM — Z00.00 ENCOUNTER FOR PREVENTIVE HEALTH EXAMINATION: Noted: 2017-02-07

## 2017-04-03 ENCOUNTER — APPOINTMENT (OUTPATIENT)
Dept: ORTHOPEDIC SURGERY | Facility: CLINIC | Age: 52
End: 2017-04-03

## 2017-04-03 DIAGNOSIS — Z00.00 ENCOUNTER FOR GENERAL ADULT MEDICAL EXAMINATION W/OUT ABNORMAL FINDINGS: ICD-10-CM

## 2017-04-11 ENCOUNTER — OUTPATIENT (OUTPATIENT)
Dept: OUTPATIENT SERVICES | Facility: HOSPITAL | Age: 52
LOS: 1 days | End: 2017-04-11
Payer: MEDICAID

## 2017-04-11 DIAGNOSIS — Z98.891 HISTORY OF UTERINE SCAR FROM PREVIOUS SURGERY: Chronic | ICD-10-CM

## 2017-04-11 DIAGNOSIS — Z51.89 ENCOUNTER FOR OTHER SPECIFIED AFTERCARE: ICD-10-CM

## 2017-04-11 DIAGNOSIS — S82.851D DISPLACED TRIMALLEOLAR FRACTURE OF RIGHT LOWER LEG, SUBSEQUENT ENCOUNTER FOR CLOSED FRACTURE WITH ROUTINE HEALING: ICD-10-CM

## 2017-04-11 DIAGNOSIS — Z98.51 TUBAL LIGATION STATUS: Chronic | ICD-10-CM

## 2017-04-11 DIAGNOSIS — Z90.89 ACQUIRED ABSENCE OF OTHER ORGANS: Chronic | ICD-10-CM

## 2017-05-04 PROCEDURE — 97140 MANUAL THERAPY 1/> REGIONS: CPT

## 2017-05-04 PROCEDURE — 97162 PT EVAL MOD COMPLEX 30 MIN: CPT

## 2017-05-04 PROCEDURE — 97110 THERAPEUTIC EXERCISES: CPT

## 2017-05-04 PROCEDURE — 97116 GAIT TRAINING THERAPY: CPT

## 2017-05-04 PROCEDURE — 97010 HOT OR COLD PACKS THERAPY: CPT

## 2017-05-09 ENCOUNTER — APPOINTMENT (OUTPATIENT)
Dept: ORTHOPEDIC SURGERY | Facility: CLINIC | Age: 52
End: 2017-05-09

## 2017-05-09 DIAGNOSIS — S82.851D DISPLACED TRIMALLEOLAR FRACTURE OF RIGHT LOWER LEG, SUBSEQUENT ENCOUNTER FOR CLOSED FRACTURE WITH ROUTINE HEALING: ICD-10-CM

## 2017-10-07 ENCOUNTER — INPATIENT (INPATIENT)
Facility: HOSPITAL | Age: 52
LOS: 2 days | Discharge: HOME | End: 2017-10-10
Attending: EMERGENCY MEDICINE | Admitting: EMERGENCY MEDICINE
Payer: MEDICAID

## 2017-10-07 VITALS — WEIGHT: 171.08 LBS

## 2017-10-07 DIAGNOSIS — E87.1 HYPO-OSMOLALITY AND HYPONATREMIA: ICD-10-CM

## 2017-10-07 DIAGNOSIS — Z98.891 HISTORY OF UTERINE SCAR FROM PREVIOUS SURGERY: Chronic | ICD-10-CM

## 2017-10-07 DIAGNOSIS — Z90.89 ACQUIRED ABSENCE OF OTHER ORGANS: Chronic | ICD-10-CM

## 2017-10-07 DIAGNOSIS — Z98.51 TUBAL LIGATION STATUS: Chronic | ICD-10-CM

## 2017-10-07 LAB
ADD ON TEST-SPECIMEN IN LAB: SIGNIFICANT CHANGE UP
ALBUMIN SERPL ELPH-MCNC: 4.1 G/DL — SIGNIFICANT CHANGE UP (ref 3.3–5)
ALP SERPL-CCNC: 213 U/L — HIGH (ref 40–120)
ALT FLD-CCNC: 78 U/L — SIGNIFICANT CHANGE UP (ref 12–78)
ANION GAP SERPL CALC-SCNC: 11 MMOL/L — SIGNIFICANT CHANGE UP (ref 5–17)
APPEARANCE UR: (no result)
AST SERPL-CCNC: 69 U/L — HIGH (ref 15–37)
BACTERIA # UR AUTO: (no result)
BASOPHILS # BLD AUTO: 0.2 K/UL — SIGNIFICANT CHANGE UP (ref 0–0.2)
BASOPHILS NFR BLD AUTO: 3.5 % — HIGH (ref 0–2)
BILIRUB SERPL-MCNC: 0.5 MG/DL — SIGNIFICANT CHANGE UP (ref 0.2–1.2)
BILIRUB UR-MCNC: NEGATIVE — SIGNIFICANT CHANGE UP
BUN SERPL-MCNC: 22 MG/DL — SIGNIFICANT CHANGE UP (ref 7–23)
CALCIUM SERPL-MCNC: 10.1 MG/DL — SIGNIFICANT CHANGE UP (ref 8.5–10.1)
CHLORIDE SERPL-SCNC: 95 MMOL/L — LOW (ref 96–108)
CO2 SERPL-SCNC: 27 MMOL/L — SIGNIFICANT CHANGE UP (ref 22–31)
COLOR SPEC: YELLOW — SIGNIFICANT CHANGE UP
COMMENT - URINE: SIGNIFICANT CHANGE UP
CREAT SERPL-MCNC: 1.23 MG/DL — SIGNIFICANT CHANGE UP (ref 0.5–1.3)
DIFF PNL FLD: (no result)
EOSINOPHIL # BLD AUTO: 0 K/UL — SIGNIFICANT CHANGE UP (ref 0–0.5)
EOSINOPHIL NFR BLD AUTO: 0.3 % — SIGNIFICANT CHANGE UP (ref 0–6)
GLUCOSE SERPL-MCNC: 124 MG/DL — HIGH (ref 70–99)
GLUCOSE UR QL: NEGATIVE MG/DL — SIGNIFICANT CHANGE UP
HCG SERPL-ACNC: <1 MIU/ML — SIGNIFICANT CHANGE UP
HCT VFR BLD CALC: 45.3 % — HIGH (ref 34.5–45)
HGB BLD-MCNC: 15.2 G/DL — SIGNIFICANT CHANGE UP (ref 11.5–15.5)
HYALINE CASTS # UR AUTO: (no result) /LPF
KETONES UR-MCNC: (no result)
LEUKOCYTE ESTERASE UR-ACNC: (no result)
LIDOCAIN IGE QN: 240 U/L — SIGNIFICANT CHANGE UP (ref 73–393)
LYMPHOCYTES # BLD AUTO: 0.6 K/UL — LOW (ref 1–3.3)
LYMPHOCYTES # BLD AUTO: 11.6 % — LOW (ref 13–44)
MCHC RBC-ENTMCNC: 29.5 PG — SIGNIFICANT CHANGE UP (ref 27–34)
MCHC RBC-ENTMCNC: 33.6 GM/DL — SIGNIFICANT CHANGE UP (ref 32–36)
MCV RBC AUTO: 87.8 FL — SIGNIFICANT CHANGE UP (ref 80–100)
MONOCYTES # BLD AUTO: 0.6 K/UL — SIGNIFICANT CHANGE UP (ref 0–0.9)
MONOCYTES NFR BLD AUTO: 12.1 % — SIGNIFICANT CHANGE UP (ref 2–14)
NEUTROPHILS # BLD AUTO: 3.7 K/UL — SIGNIFICANT CHANGE UP (ref 1.8–7.4)
NEUTROPHILS NFR BLD AUTO: 72.6 % — SIGNIFICANT CHANGE UP (ref 43–77)
NITRITE UR-MCNC: NEGATIVE — SIGNIFICANT CHANGE UP
PH UR: 5 — SIGNIFICANT CHANGE UP (ref 5–8)
PLATELET # BLD AUTO: 187 K/UL — SIGNIFICANT CHANGE UP (ref 150–400)
POTASSIUM SERPL-MCNC: 3.2 MMOL/L — LOW (ref 3.5–5.3)
POTASSIUM SERPL-SCNC: 3.2 MMOL/L — LOW (ref 3.5–5.3)
PROT SERPL-MCNC: 9.8 GM/DL — HIGH (ref 6–8.3)
PROT UR-MCNC: 100 MG/DL
RBC # BLD: 5.16 M/UL — SIGNIFICANT CHANGE UP (ref 3.8–5.2)
RBC # FLD: 13 % — SIGNIFICANT CHANGE UP (ref 10.3–14.5)
RBC CASTS # UR COMP ASSIST: (no result) /HPF (ref 0–4)
SODIUM SERPL-SCNC: 133 MMOL/L — LOW (ref 135–145)
SP GR SPEC: 1.01 — SIGNIFICANT CHANGE UP (ref 1.01–1.02)
UROBILINOGEN FLD QL: NEGATIVE MG/DL — SIGNIFICANT CHANGE UP
WBC # BLD: 5.1 K/UL — SIGNIFICANT CHANGE UP (ref 3.8–10.5)
WBC # FLD AUTO: 5.1 K/UL — SIGNIFICANT CHANGE UP (ref 3.8–10.5)
WBC UR QL: SIGNIFICANT CHANGE UP

## 2017-10-07 PROCEDURE — 71020: CPT | Mod: 26

## 2017-10-07 PROCEDURE — 93010 ELECTROCARDIOGRAM REPORT: CPT

## 2017-10-07 PROCEDURE — 99285 EMERGENCY DEPT VISIT HI MDM: CPT

## 2017-10-07 PROCEDURE — 74177 CT ABD & PELVIS W/CONTRAST: CPT | Mod: 26

## 2017-10-07 RX ORDER — SODIUM CHLORIDE 9 MG/ML
1000 INJECTION INTRAMUSCULAR; INTRAVENOUS; SUBCUTANEOUS
Qty: 0 | Refills: 0 | Status: DISCONTINUED | OUTPATIENT
Start: 2017-10-07 | End: 2017-10-09

## 2017-10-07 RX ORDER — ALBUTEROL 90 UG/1
2.5 AEROSOL, METERED ORAL EVERY 6 HOURS
Qty: 0 | Refills: 0 | Status: DISCONTINUED | OUTPATIENT
Start: 2017-10-07 | End: 2017-10-10

## 2017-10-07 RX ORDER — SODIUM CHLORIDE 9 MG/ML
1000 INJECTION INTRAMUSCULAR; INTRAVENOUS; SUBCUTANEOUS ONCE
Qty: 0 | Refills: 0 | Status: COMPLETED | OUTPATIENT
Start: 2017-10-07 | End: 2017-10-07

## 2017-10-07 RX ORDER — MORPHINE SULFATE 50 MG/1
2 CAPSULE, EXTENDED RELEASE ORAL EVERY 6 HOURS
Qty: 0 | Refills: 0 | Status: DISCONTINUED | OUTPATIENT
Start: 2017-10-07 | End: 2017-10-10

## 2017-10-07 RX ORDER — HEPARIN SODIUM 5000 [USP'U]/ML
5000 INJECTION INTRAVENOUS; SUBCUTANEOUS EVERY 8 HOURS
Qty: 0 | Refills: 0 | Status: DISCONTINUED | OUTPATIENT
Start: 2017-10-07 | End: 2017-10-10

## 2017-10-07 RX ORDER — CIPROFLOXACIN LACTATE 400MG/40ML
400 VIAL (ML) INTRAVENOUS ONCE
Qty: 0 | Refills: 0 | Status: COMPLETED | OUTPATIENT
Start: 2017-10-07 | End: 2017-10-07

## 2017-10-07 RX ORDER — ONDANSETRON 8 MG/1
4 TABLET, FILM COATED ORAL ONCE
Qty: 0 | Refills: 0 | Status: COMPLETED | OUTPATIENT
Start: 2017-10-07 | End: 2017-10-07

## 2017-10-07 RX ORDER — MORPHINE SULFATE 50 MG/1
4 CAPSULE, EXTENDED RELEASE ORAL ONCE
Qty: 0 | Refills: 0 | Status: DISCONTINUED | OUTPATIENT
Start: 2017-10-07 | End: 2017-10-07

## 2017-10-07 RX ORDER — METRONIDAZOLE 500 MG
500 TABLET ORAL EVERY 8 HOURS
Qty: 0 | Refills: 0 | Status: DISCONTINUED | OUTPATIENT
Start: 2017-10-07 | End: 2017-10-10

## 2017-10-07 RX ORDER — METOCLOPRAMIDE HCL 10 MG
10 TABLET ORAL ONCE
Qty: 0 | Refills: 0 | Status: COMPLETED | OUTPATIENT
Start: 2017-10-07 | End: 2017-10-08

## 2017-10-07 RX ORDER — MORPHINE SULFATE 50 MG/1
2 CAPSULE, EXTENDED RELEASE ORAL EVERY 4 HOURS
Qty: 0 | Refills: 0 | Status: DISCONTINUED | OUTPATIENT
Start: 2017-10-07 | End: 2017-10-10

## 2017-10-07 RX ORDER — ALBUTEROL 90 UG/1
1 AEROSOL, METERED ORAL EVERY 4 HOURS
Qty: 0 | Refills: 0 | Status: DISCONTINUED | OUTPATIENT
Start: 2017-10-07 | End: 2017-10-10

## 2017-10-07 RX ORDER — CIPROFLOXACIN LACTATE 400MG/40ML
400 VIAL (ML) INTRAVENOUS EVERY 12 HOURS
Qty: 0 | Refills: 0 | Status: DISCONTINUED | OUTPATIENT
Start: 2017-10-07 | End: 2017-10-10

## 2017-10-07 RX ORDER — ONDANSETRON 8 MG/1
4 TABLET, FILM COATED ORAL EVERY 6 HOURS
Qty: 0 | Refills: 0 | Status: DISCONTINUED | OUTPATIENT
Start: 2017-10-07 | End: 2017-10-10

## 2017-10-07 RX ORDER — POTASSIUM CHLORIDE 20 MEQ
40 PACKET (EA) ORAL ONCE
Qty: 0 | Refills: 0 | Status: COMPLETED | OUTPATIENT
Start: 2017-10-07 | End: 2017-10-07

## 2017-10-07 RX ORDER — METRONIDAZOLE 500 MG
500 TABLET ORAL ONCE
Qty: 0 | Refills: 0 | Status: COMPLETED | OUTPATIENT
Start: 2017-10-07 | End: 2017-10-07

## 2017-10-07 RX ORDER — DEXTROSE MONOHYDRATE, SODIUM CHLORIDE, AND POTASSIUM CHLORIDE 50; .745; 4.5 G/1000ML; G/1000ML; G/1000ML
1000 INJECTION, SOLUTION INTRAVENOUS
Qty: 0 | Refills: 0 | Status: DISCONTINUED | OUTPATIENT
Start: 2017-10-07 | End: 2017-10-09

## 2017-10-07 RX ORDER — ACETAMINOPHEN 500 MG
650 TABLET ORAL EVERY 6 HOURS
Qty: 0 | Refills: 0 | Status: DISCONTINUED | OUTPATIENT
Start: 2017-10-07 | End: 2017-10-10

## 2017-10-07 RX ORDER — HEPARIN SODIUM 5000 [USP'U]/ML
5000 INJECTION INTRAVENOUS; SUBCUTANEOUS EVERY 8 HOURS
Qty: 0 | Refills: 0 | Status: DISCONTINUED | OUTPATIENT
Start: 2017-10-07 | End: 2017-10-07

## 2017-10-07 RX ORDER — HYDROCHLOROTHIAZIDE 25 MG
12.5 TABLET ORAL EVERY OTHER DAY
Qty: 0 | Refills: 0 | Status: DISCONTINUED | OUTPATIENT
Start: 2017-10-07 | End: 2017-10-08

## 2017-10-07 RX ADMIN — SODIUM CHLORIDE 500 MILLILITER(S): 9 INJECTION INTRAMUSCULAR; INTRAVENOUS; SUBCUTANEOUS at 22:40

## 2017-10-07 RX ADMIN — MORPHINE SULFATE 2 MILLIGRAM(S): 50 CAPSULE, EXTENDED RELEASE ORAL at 23:28

## 2017-10-07 RX ADMIN — MORPHINE SULFATE 4 MILLIGRAM(S): 50 CAPSULE, EXTENDED RELEASE ORAL at 16:37

## 2017-10-07 RX ADMIN — MORPHINE SULFATE 2 MILLIGRAM(S): 50 CAPSULE, EXTENDED RELEASE ORAL at 21:56

## 2017-10-07 RX ADMIN — SODIUM CHLORIDE 1500 MILLILITER(S): 9 INJECTION INTRAMUSCULAR; INTRAVENOUS; SUBCUTANEOUS at 16:00

## 2017-10-07 RX ADMIN — Medication 200 MILLIGRAM(S): at 19:33

## 2017-10-07 RX ADMIN — DEXTROSE MONOHYDRATE, SODIUM CHLORIDE, AND POTASSIUM CHLORIDE 100 MILLILITER(S): 50; .745; 4.5 INJECTION, SOLUTION INTRAVENOUS at 23:51

## 2017-10-07 RX ADMIN — MORPHINE SULFATE 4 MILLIGRAM(S): 50 CAPSULE, EXTENDED RELEASE ORAL at 17:30

## 2017-10-07 RX ADMIN — Medication 100 MILLIGRAM(S): at 19:33

## 2017-10-07 RX ADMIN — ONDANSETRON 4 MILLIGRAM(S): 8 TABLET, FILM COATED ORAL at 16:56

## 2017-10-07 RX ADMIN — ONDANSETRON 4 MILLIGRAM(S): 8 TABLET, FILM COATED ORAL at 16:11

## 2017-10-07 RX ADMIN — MORPHINE SULFATE 4 MILLIGRAM(S): 50 CAPSULE, EXTENDED RELEASE ORAL at 20:03

## 2017-10-07 RX ADMIN — HEPARIN SODIUM 5000 UNIT(S): 5000 INJECTION INTRAVENOUS; SUBCUTANEOUS at 23:39

## 2017-10-07 RX ADMIN — MORPHINE SULFATE 4 MILLIGRAM(S): 50 CAPSULE, EXTENDED RELEASE ORAL at 19:33

## 2017-10-07 RX ADMIN — ONDANSETRON 4 MILLIGRAM(S): 8 TABLET, FILM COATED ORAL at 21:40

## 2017-10-07 RX ADMIN — MORPHINE SULFATE 4 MILLIGRAM(S): 50 CAPSULE, EXTENDED RELEASE ORAL at 16:56

## 2017-10-07 RX ADMIN — MORPHINE SULFATE 4 MILLIGRAM(S): 50 CAPSULE, EXTENDED RELEASE ORAL at 16:11

## 2017-10-07 NOTE — ED ADULT NURSE REASSESSMENT NOTE - COMFORT CARE
plan of care explained/warm blanket provided/wait time explained
warm blanket provided/plan of care explained

## 2017-10-07 NOTE — ED STATDOCS - ATTENDING CONTRIBUTION TO CARE
I, Bridger Beatty DO,  performed the initial face to face bedside interview with this patient regarding history of present illness, review of symptoms and relevant past medical, social and family history.  I completed an independent physical examination.  I was the initial provider who evaluated this patient. I have signed out the follow up of any pending tests (i.e. labs, radiological studies) to the ACP.  I have communicated the patient’s plan of care and disposition with the ACP.

## 2017-10-07 NOTE — ED STATDOCS - NS_ ATTENDINGSCRIBEDETAILS _ED_A_ED_FT
Bridger Beatty DO (Attending): The history, relevant review of systems, past medical and surgical history, medical decision making, and physical examination was documented by the scribe in my presence and I attest to the accuracy of the documentation.

## 2017-10-07 NOTE — H&P ADULT - HISTORY OF PRESENT ILLNESS
52 yr old black female w HTN, asthma, hx diverticulitis, presented to  ED c/o N/V and abdominal pain       Was to go to work but stayed home on 10-03 since she was nauseous and vomiting.  Had some loose stools on 10-04 but nothing since.  +N and vomiting progressed to include inability to keep water down.  Tactile temp reported.  N/V was accompanied by abdominal pain that she cannot grade at present; increases w po intake and w palpation       In ED received morphine for pain and zofran for nausea.  CT scan revealed desc colitis.  Placed on IV cipro/flagyl       Reported that return of nausea and is vomiting clear emesis in ED.       No travel hx or known sick contacts    Past Med Hx  1) HTN on HCTZ  2) Asthma was intubated 3 times, last in 1990;  uses pump 2 times a day  3) Diverticululitis that also presetned w N/V  4) R Fibula fx after mechanical fall; had ORIF here w Dr. Bella  5) Sickle cell trait    Past Surg Hx  1) R distal fibula ORIF   2) Adenoidectomy 1982  3) C section x4  1994, 1995, 1998, 1999  4) Tubal ligation 1999    Fam Hx neg in 1st degree relatives

## 2017-10-07 NOTE — ED ADULT NURSE NOTE - OBJECTIVE STATEMENT
Pt has hx diverticulitis, c/o N/V worsening over the past few days, unable to tolerate PO at this time, c/o pain 8/10.

## 2017-10-07 NOTE — H&P ADULT - ASSESSMENT
1) Acute colitis  A) desc and sigmoid colon  B) prior divertic by hx in past; no hx colitis; infectious vs inflammatory  1. IVF  2. PO clears when able  3. CIpro/Flagyl  4. GI consult  5. morphine  6. warm compresses to abdomen    2) Nausea and vomiting   A) secondary to above  B) some K taken po  1. prn meds ordered    3) Hypokalemia  A) due to diuretic and   B) recent vomiting  1. replace IV w maintenance fluis  2. recheck BMP in AM  3. Mg in AM    4) Asthma  A) maintains herself on albuterol pump BID  B) scattered wheeze on exam after vomiting  1. nebs ordered q 6hr  2. control v as per problem 2    5) VTE  sq heparin 1) Acute colitis  A) desc and sigmoid colon  B) prior divertic by hx in past; no hx colitis; infectious vs inflammatory  1. IVF  2. PO clears when able  3. CIpro/Flagyl  4. GI consult  5. morphine  6. warm compresses to abdomen    2) Nausea and vomiting   A) secondary to above  B) some K taken po  1. prn meds ordered    3) Hypokalemia  A) due to diuretic and   B) recent vomiting  1. replace IV w maintenance fluis  2. recheck BMP in AM  3. Mg in AM    4) Asthma  A) maintains herself on albuterol pump BID  B) scattered wheeze on exam after vomiting  1. nebs ordered q 6hr  2. control v as per problem 2    5) HCTZ  1. restart in AM    6) VTE  sq heparin

## 2017-10-07 NOTE — ED STATDOCS - PROGRESS NOTE DETAILS
signed Teresa Tamez PA-C Pt seen in intake initially by Dr Beatty.   pt c/o abd pain, N/V x 4 days. Similar to prior bouts of divericulitis, pt told she would probably need sx at some point for the disease. recently moved to [place]. No GI doctor here. Abdomen diffusely tender. Plan labs, CT. Pt agrees with plan of  care. signed Teresa Tamez PA-C   Pt with left sided colitis on CT. Pt with persistent abd pain and nausea in ED. pt is poor candidate for outpt tx due to pain and vomiting. Pt agrees with admission and plan of care.

## 2017-10-07 NOTE — ED STATDOCS - OBJECTIVE STATEMENT
51 y/o female pmhx asthma, diverticulitis presents to ED due to vomiting since Tuesday. C/o upper abd pain, nausea. She has been unable to keep anything down. Denies dysuria. states she has been having sweats. 53 y/o female pmhx asthma, diverticulitis presents to ED due to vomiting since Tuesday. C/o upper abd pain, nausea. She has been unable to keep anything down. Denies dysuria. states she has been having sweats.  Denies F/C/CP/SOB.  No travel/trauma.

## 2017-10-07 NOTE — H&P ADULT - NSHPPHYSICALEXAM_GEN_ALL_CORE
Vital Signs Last 24 Hrs  T(C): 36.8 (07 Oct 2017 21:17), Max: 37.6 (07 Oct 2017 16:37)  T(F): 98.2 (07 Oct 2017 21:17), Max: 99.6 (07 Oct 2017 16:37)  HR: 87 (07 Oct 2017 21:17) (87 - 100)  BP: 121/89 (07 Oct 2017 21:17) (121/89 - 131/86)  BP(mean): 95 (07 Oct 2017 15:16) (95 - 95)  RR: 18 (07 Oct 2017 21:17) (18 - 18)  SpO2: 98% (07 Oct 2017 21:17) (98% - 100%)    52 yr old female looking ill and uncomfortable; not toxic  HEENT: pupils reactive, anicteric sclera  Neck supple   Chest few wheezes scattered  Cor RR S1 + S2  Abd + BS,  soft,  + distended,  +TTP both lower quadrants L>R  Extrem no CCE  MSK no calf tenderness  SKin no lesions  + dry

## 2017-10-07 NOTE — ED ADULT TRIAGE NOTE - CHIEF COMPLAINT QUOTE
Pt presents to ED c/o vomiting since Tuesday. Pt reports inability to tolerate PO intake for 2 days. Pt reports diarrhea on Wednesday but no BM since.

## 2017-10-07 NOTE — H&P ADULT - NSHPLABSRESULTS_GEN_ALL_CORE
CT scan abd + pelvis:     The bowel demonstrates mucosal thickening involving the distal descending   and proximal and mid sigmoid colon consistent with colitis. Moderate   diverticulosis involves the sigmoid colon without focal diverticulitis..    No obstruction, perforation or abscess is recognized.           IMPRESSION: mucosal thickening involving the distal descending and   proximal and mid sigmoid colon consistent with colitis.

## 2017-10-08 DIAGNOSIS — S82.891A OTHER FRACTURE OF RIGHT LOWER LEG, INITIAL ENCOUNTER FOR CLOSED FRACTURE: Chronic | ICD-10-CM

## 2017-10-08 LAB
ANION GAP SERPL CALC-SCNC: 10 MMOL/L — SIGNIFICANT CHANGE UP (ref 5–17)
BASOPHILS # BLD AUTO: 0.1 K/UL — SIGNIFICANT CHANGE UP (ref 0–0.2)
BUN SERPL-MCNC: 20 MG/DL — SIGNIFICANT CHANGE UP (ref 7–23)
C DIFF BY PCR RESULT: SIGNIFICANT CHANGE UP
C DIFF TOX GENS STL QL NAA+PROBE: SIGNIFICANT CHANGE UP
CALCIUM SERPL-MCNC: 8.7 MG/DL — SIGNIFICANT CHANGE UP (ref 8.5–10.1)
CHLORIDE SERPL-SCNC: 98 MMOL/L — SIGNIFICANT CHANGE UP (ref 96–108)
CO2 SERPL-SCNC: 28 MMOL/L — SIGNIFICANT CHANGE UP (ref 22–31)
CREAT SERPL-MCNC: 0.83 MG/DL — SIGNIFICANT CHANGE UP (ref 0.5–1.3)
EOSINOPHIL # BLD AUTO: 0 K/UL — SIGNIFICANT CHANGE UP (ref 0–0.5)
GLUCOSE SERPL-MCNC: 104 MG/DL — HIGH (ref 70–99)
HCT VFR BLD CALC: 37.9 % — SIGNIFICANT CHANGE UP (ref 34.5–45)
HGB BLD-MCNC: 13.1 G/DL — SIGNIFICANT CHANGE UP (ref 11.5–15.5)
LYMPHOCYTES # BLD AUTO: 0.8 K/UL — LOW (ref 1–3.3)
LYMPHOCYTES # BLD AUTO: 24 % — SIGNIFICANT CHANGE UP (ref 13–44)
MAGNESIUM SERPL-MCNC: 2.5 MG/DL — SIGNIFICANT CHANGE UP (ref 1.6–2.6)
MANUAL DIF COMMENT BLD-IMP: SIGNIFICANT CHANGE UP
MCHC RBC-ENTMCNC: 30.5 PG — SIGNIFICANT CHANGE UP (ref 27–34)
MCHC RBC-ENTMCNC: 34.6 GM/DL — SIGNIFICANT CHANGE UP (ref 32–36)
MCV RBC AUTO: 88.1 FL — SIGNIFICANT CHANGE UP (ref 80–100)
MONOCYTES # BLD AUTO: 0.7 K/UL — SIGNIFICANT CHANGE UP (ref 0–0.9)
MONOCYTES NFR BLD AUTO: 13 % — SIGNIFICANT CHANGE UP (ref 2–14)
NEUTROPHILS # BLD AUTO: 2.6 K/UL — SIGNIFICANT CHANGE UP (ref 1.8–7.4)
NEUTROPHILS NFR BLD AUTO: 62 % — SIGNIFICANT CHANGE UP (ref 43–77)
NEUTS BAND # BLD: 1 % — SIGNIFICANT CHANGE UP (ref 0–8)
PLAT MORPH BLD: NORMAL — SIGNIFICANT CHANGE UP
PLATELET # BLD AUTO: 168 K/UL — SIGNIFICANT CHANGE UP (ref 150–400)
PLATELET COUNT - ESTIMATE: NORMAL — SIGNIFICANT CHANGE UP
POTASSIUM SERPL-MCNC: 3 MMOL/L — LOW (ref 3.5–5.3)
POTASSIUM SERPL-SCNC: 3 MMOL/L — LOW (ref 3.5–5.3)
RBC # BLD: 4.31 M/UL — SIGNIFICANT CHANGE UP (ref 3.8–5.2)
RBC # FLD: 13.2 % — SIGNIFICANT CHANGE UP (ref 10.3–14.5)
RBC BLD AUTO: NORMAL — SIGNIFICANT CHANGE UP
SODIUM SERPL-SCNC: 136 MMOL/L — SIGNIFICANT CHANGE UP (ref 135–145)
WBC # BLD: 4.3 K/UL — SIGNIFICANT CHANGE UP (ref 3.8–10.5)
WBC # FLD AUTO: 4.3 K/UL — SIGNIFICANT CHANGE UP (ref 3.8–10.5)

## 2017-10-08 RX ORDER — POTASSIUM CHLORIDE 20 MEQ
40 PACKET (EA) ORAL EVERY 4 HOURS
Qty: 0 | Refills: 0 | Status: COMPLETED | OUTPATIENT
Start: 2017-10-08 | End: 2017-10-08

## 2017-10-08 RX ORDER — ZOLPIDEM TARTRATE 10 MG/1
5 TABLET ORAL AT BEDTIME
Qty: 0 | Refills: 0 | Status: DISCONTINUED | OUTPATIENT
Start: 2017-10-08 | End: 2017-10-10

## 2017-10-08 RX ADMIN — Medication 100 MILLIGRAM(S): at 14:52

## 2017-10-08 RX ADMIN — Medication 40 MILLIEQUIVALENT(S): at 17:39

## 2017-10-08 RX ADMIN — Medication 12.5 MILLIGRAM(S): at 12:19

## 2017-10-08 RX ADMIN — ALBUTEROL 2.5 MILLIGRAM(S): 90 AEROSOL, METERED ORAL at 07:47

## 2017-10-08 RX ADMIN — ONDANSETRON 4 MILLIGRAM(S): 8 TABLET, FILM COATED ORAL at 03:40

## 2017-10-08 RX ADMIN — Medication 100 MILLIGRAM(S): at 21:19

## 2017-10-08 RX ADMIN — Medication 40 MILLIEQUIVALENT(S): at 12:18

## 2017-10-08 RX ADMIN — HEPARIN SODIUM 5000 UNIT(S): 5000 INJECTION INTRAVENOUS; SUBCUTANEOUS at 14:52

## 2017-10-08 RX ADMIN — Medication 100 MILLIGRAM(S): at 05:19

## 2017-10-08 RX ADMIN — Medication 200 MILLIGRAM(S): at 17:37

## 2017-10-08 RX ADMIN — HEPARIN SODIUM 5000 UNIT(S): 5000 INJECTION INTRAVENOUS; SUBCUTANEOUS at 21:21

## 2017-10-08 RX ADMIN — HEPARIN SODIUM 5000 UNIT(S): 5000 INJECTION INTRAVENOUS; SUBCUTANEOUS at 05:19

## 2017-10-08 RX ADMIN — Medication 200 MILLIGRAM(S): at 05:19

## 2017-10-08 RX ADMIN — ZOLPIDEM TARTRATE 5 MILLIGRAM(S): 10 TABLET ORAL at 22:09

## 2017-10-08 RX ADMIN — Medication 10 MILLIGRAM(S): at 02:15

## 2017-10-08 RX ADMIN — Medication 40 MILLIEQUIVALENT(S): at 14:52

## 2017-10-08 RX ADMIN — DEXTROSE MONOHYDRATE, SODIUM CHLORIDE, AND POTASSIUM CHLORIDE 100 MILLILITER(S): 50; .745; 4.5 INJECTION, SOLUTION INTRAVENOUS at 22:08

## 2017-10-08 NOTE — DIETITIAN INITIAL EVALUATION ADULT. - OTHER INFO
consult for N/V.  Pt on CL diet.  PMH of asthma/diverticulitis.  Pt also presents with ab pain, is hypokalemic. consult for N/V.  Pt on CL diet.  PMH of asthma/diverticulitis.  Pt also presents with ab pain, is hypokalemic. N/V resolved.  Pt given recommendations for high potassium foods.  Pt reports no recent wt loss or gain.  Pt's height is 5'5".

## 2017-10-08 NOTE — DIETITIAN INITIAL EVALUATION ADULT. - ENERGY NEEDS
Ht.      "        Wt.   78     kg               BMI                  IBW       kg               Pt is at    %  IBW

## 2017-10-08 NOTE — PATIENT PROFILE ADULT. - PSH
Closed right ankle fracture    S/P adenoidectomy    S/P  section  X4; , , ,   S/P tubal ligation  1999

## 2017-10-08 NOTE — PATIENT PROFILE ADULT. - PMH
Anemia    Ankle pain, right    Anxiety    Asthma    Diverticulitis    Fibula fracture  right  Hordeolum externum of left upper eyelid    HTN (hypertension)    Nasal polyps    Sickle cell trait    Tibia fracture  right

## 2017-10-08 NOTE — DIETITIAN INITIAL EVALUATION ADULT. - NUTRITIONGOAL OUTCOME1
Advance diet when medically feasible to full liquid to low fiber, pt will consume and tolerate > 75%

## 2017-10-08 NOTE — CONSULT NOTE ADULT - ASSESSMENT
N/v and lower abd pain   Colitis    r/o infectious vs. inflammatory    check stool c/s, c diff and o/p    iv cipro and flagyl    iv hydration   antiemetic prn   pain control    supplement K    Clear liquids

## 2017-10-08 NOTE — PROGRESS NOTE ADULT - SUBJECTIVE AND OBJECTIVE BOX
CHIEF COMPLAINT:    SUBJECTIVE:     REVIEW OF SYSTEMS:    CONSTITUTIONAL: No weakness, fevers or chills  EYES/ENT: No visual changes;  No vertigo or throat pain   NECK: No pain or stiffness  RESPIRATORY: No cough, wheezing, hemoptysis; No shortness of breath  CARDIOVASCULAR: No chest pain or palpitations  GASTROINTESTINAL: No abdominal or epigastric pain. No nausea, vomiting, or hematemesis; No diarrhea or constipation. No melena or hematochezia.  GENITOURINARY: No dysuria, frequency or hematuria  NEUROLOGICAL: No numbness or weakness  SKIN: No itching, burning, rashes, or lesions   All other review of systems is negative unless indicated above    Vital Signs Last 24 Hrs  T(C): 37.1 (08 Oct 2017 05:37), Max: 37.6 (07 Oct 2017 16:37)  T(F): 98.8 (08 Oct 2017 05:37), Max: 99.6 (07 Oct 2017 16:37)  HR: 88 (08 Oct 2017 07:49) (80 - 100)  BP: 145/94 (08 Oct 2017 05:37) (121/89 - 145/94)  BP(mean): 95 (07 Oct 2017 15:16) (95 - 95)  RR: 18 (08 Oct 2017 05:37) (18 - 18)  SpO2: 99% (08 Oct 2017 05:37) (98% - 100%)    I&O's Summary    07 Oct 2017 07:01  -  08 Oct 2017 07:00  --------------------------------------------------------  IN: 100 mL / OUT: 0 mL / NET: 100 mL        CAPILLARY BLOOD GLUCOSE          PHYSICAL EXAM:    Constitutional: NAD, awake and alert, well-developed  HEENT: PERR, EOMI, Normal Hearing, MMM  Neck: Soft and supple, No LAD, No JVD  Respiratory: Breath sounds are clear bilaterally, No wheezing, rales or rhonchi  Cardiovascular: S1 and S2, regular rate and rhythm, no Murmurs, gallops or rubs  Gastrointestinal: Bowel Sounds present, soft, nontender, nondistended, no guarding, no rebound  Extremities: No peripheral edema  Vascular: 2+ peripheral pulses  Neurological: A/O x 3, no focal deficits  Musculoskeletal: 5/5 strength b/l upper and lower extremities  Skin: No rashes    MEDICATIONS:  MEDICATIONS  (STANDING):  ALBUTerol    0.083% 2.5 milliGRAM(s) Nebulizer every 6 hours  ALBUTerol    90 MICROgram(s) HFA Inhaler 1 Puff(s) Inhalation every 4 hours  ciprofloxacin   IVPB 400 milliGRAM(s) IV Intermittent every 12 hours  heparin  Injectable 5000 Unit(s) SubCutaneous every 8 hours  hydrochlorothiazide 12.5 milliGRAM(s) Oral every other day  metroNIDAZOLE  IVPB 500 milliGRAM(s) IV Intermittent every 8 hours  sodium chloride 0.9% with potassium chloride 20 mEq/L 1000 milliLiter(s) (100 mL/Hr) IV Continuous <Continuous>  sodium chloride 0.9%. 1000 milliLiter(s) (500 mL/Hr) IV Continuous <Continuous>      LABS: All Labs Reviewed:                        13.1   4.3   )-----------( 168      ( 08 Oct 2017 06:18 )             37.9     10-08    136  |  98  |  20  ----------------------------<  104<H>  3.0<L>   |  28  |  0.83    Ca    8.7      08 Oct 2017 06:18  Mg     2.5     10-08    TPro  9.8<H>  /  Alb  4.1  /  TBili  0.5  /  DBili  x   /  AST  69<H>  /  ALT  78  /  AlkPhos  213<H>  10-07          Blood Culture:     RADIOLOGY/EKG:    DVT PPX:    ADVANCED DIRECTIVE:    DISPOSITION:      52 yr old black female w HTN, asthma, hx diverticulitis, presented to  ED c/o N/V and abdominal pain       Was to go to work but stayed home on 10-03 since she was nauseous and vomiting.  Had some loose stools on 10-04 but nothing since.  +N and vomiting progressed to include inability to keep water down.  Tactile temp reported.  N/V was accompanied by abdominal pain that she cannot grade at present; increases w po intake and w palpation       In ED received morphine for pain and zofran for nausea.  CT scan revealed desc colitis.  Placed on IV cipro/flagyl    		  1) Acute colitis  A) desc and sigmoid colon  B) prior divertic by hx in past; no hx colitis; infectious vs inflammatory  1. IVF  2. PO clears when able  3. CIpro/Flagyl  4. GI consult  5. morphine  6. warm compresses to abdomen    2) Nausea and vomiting   A) secondary to above  B) some K taken po  1. prn meds ordered    3) Hypokalemia  A) due to diuretic and   B) recent vomiting  1. replace IV w maintenance fluis  2. recheck BMP in AM  3. Mg in AM    4) Asthma  A) maintains herself on albuterol pump BID  B) scattered wheeze on exam after vomiting  1. nebs ordered q 6hr  2. control v as per problem 2    5) HCTZ  1. restart in AM    6) VTE  sq heparin CHIEF COMPLAINT/Diagnosis: vommitting/ diahrea/ abdominal pain/ colitis    SUBJECTIVE: no complaints    REVIEW OF SYSTEMS:    CONSTITUTIONAL: No weakness, fevers or chills  EYES/ENT: No visual changes;  No vertigo or throat pain   NECK: No pain or stiffness  RESPIRATORY: No cough, wheezing, hemoptysis; No shortness of breath  CARDIOVASCULAR: No chest pain or palpitations  GASTROINTESTINAL: No abdominal or epigastric pain. No nausea, vomiting, or hematemesis; No diarrhea or constipation. No melena or hematochezia.  GENITOURINARY: No dysuria, frequency or hematuria  NEUROLOGICAL: No numbness or weakness  SKIN: No itching, burning, rashes, or lesions   All other review of systems is negative unless indicated above    Vital Signs Last 24 Hrs  T(C): 37.1 (08 Oct 2017 05:37), Max: 37.6 (07 Oct 2017 16:37)  T(F): 98.8 (08 Oct 2017 05:37), Max: 99.6 (07 Oct 2017 16:37)  HR: 88 (08 Oct 2017 07:49) (80 - 100)  BP: 145/94 (08 Oct 2017 05:37) (121/89 - 145/94)  BP(mean): 95 (07 Oct 2017 15:16) (95 - 95)  RR: 18 (08 Oct 2017 05:37) (18 - 18)  SpO2: 99% (08 Oct 2017 05:37) (98% - 100%)    I&O's Summary    07 Oct 2017 07:01  -  08 Oct 2017 07:00  --------------------------------------------------------  IN: 100 mL / OUT: 0 mL / NET: 100 mL        CAPILLARY BLOOD GLUCOSE          PHYSICAL EXAM:    Constitutional: NAD, awake and alert, well-developed  HEENT: PERR, EOMI, Normal Hearing, MMM  Neck: Soft and supple, No LAD, No JVD  Respiratory: Breath sounds are clear bilaterally, No wheezing, rales or rhonchi  Cardiovascular: S1 and S2, regular rate and rhythm, no Murmurs, gallops or rubs  Gastrointestinal: Bowel Sounds present, soft, nontender, nondistended, no guarding, no rebound  Extremities: No peripheral edema  Vascular: 2+ peripheral pulses  Neurological: A/O x 3, no focal deficits  Musculoskeletal: 5/5 strength b/l upper and lower extremities  Skin: No rashes    MEDICATIONS:  MEDICATIONS  (STANDING):  ALBUTerol    0.083% 2.5 milliGRAM(s) Nebulizer every 6 hours  ALBUTerol    90 MICROgram(s) HFA Inhaler 1 Puff(s) Inhalation every 4 hours  ciprofloxacin   IVPB 400 milliGRAM(s) IV Intermittent every 12 hours  heparin  Injectable 5000 Unit(s) SubCutaneous every 8 hours  hydrochlorothiazide 12.5 milliGRAM(s) Oral every other day  metroNIDAZOLE  IVPB 500 milliGRAM(s) IV Intermittent every 8 hours  sodium chloride 0.9% with potassium chloride 20 mEq/L 1000 milliLiter(s) (100 mL/Hr) IV Continuous <Continuous>  sodium chloride 0.9%. 1000 milliLiter(s) (500 mL/Hr) IV Continuous <Continuous>      LABS: All Labs Reviewed:                        13.1   4.3   )-----------( 168      ( 08 Oct 2017 06:18 )             37.9     10-08    136  |  98  |  20  ----------------------------<  104<H>  3.0<L>   |  28  |  0.83    Ca    8.7      08 Oct 2017 06:18  Mg     2.5     10-08    TPro  9.8<H>  /  Alb  4.1  /  TBili  0.5  /  DBili  x   /  AST  69<H>  /  ALT  78  /  AlkPhos  213<H>  10-07  Assessment and Plan    52 yr old black female w HTN, asthma, hx diverticulitis 3 x (previoulsy treated at [facility]; previously refused colonoscopy) , presented to  ED c/o N/V,  abdominal pain, and 1-2 episodes of diahrea.  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    1-Acute colitis  -unlikely to be c.diff , but can be on the differential as patient was exposed to diahrea  -no luekocytosis, and patient is afebrile.   -c/w cipro/flagyl  -CT scan abd/pelvis: IMPRESSION: mucosal thickening involving the distal descending and   proximal and mid sigmoid colon consistent with colitis.   -patient is now aggreeable to having a colonoscopy; will refer to outpatient GI services for colonosocpy in few weeks.  -patient had another episode of vommitting in the early morning hours. Diahrea is minmal. basically loose stool. Still feels nauseated.   -will c/w clear diet. Patient says she has a appeteite now, however will c/w clears through the night as patient just had vommiting few hours earlier.   -c/w gentle iv hydration    2- Hypokalemia  -3.0 , supplemented  -repeat in AM    3- Asthma  -c/w prn nebs    4-HTN  -hold hydrochlorthiizide as she is still having loose stool and vommiting    5-DVT proph- sc heparin

## 2017-10-08 NOTE — CONSULT NOTE ADULT - SUBJECTIVE AND OBJECTIVE BOX
HPI:  52 yr old black female w HTN, asthma, hx diverticulitis 2016 and 2017 who was supposed to be evaluated by colonoscopy but has not, comes in with fever, chills, diarrhea and lower abd cramping 4/10 in intensity, n/v  denies blood in stool  no brbpr or melena  no wt loss  appetite reduced  no jaundice  no pruritis  denies heartburn or dysphagia        PAST MEDICAL & SURGICAL HISTORY:1) HTN on HCTZ  2) Asthma was intubated 3 times, last in ;  uses pump 2 times a day  3) Diverticululitis   4) R Fibula fx after mechanical fall; had ORIF here w Dr. Bella  5) Sickle cell trait  HTN (hypertension)  Hordeolum externum of left upper eyelid  Nasal polyps  Anemia  Sickle cell trait  Anxiety  Ankle pain, right  Fibula fracture: right  Tibia fracture: right  Diverticulitis  Asthma  Closed right ankle fracture  S/P adenoidectomy:   S/P tubal ligation:   S/P  section: X4; , , ,   1) R distal fibula ORIF   2) Adenoidectomy   3) C section x4  , , ,   4) Tubal ligation     Allergies    aspirin (Short breath)    Intolerances        MEDICATIONS  (STANDING):  ALBUTerol    0.083% 2.5 milliGRAM(s) Nebulizer every 6 hours  ALBUTerol    90 MICROgram(s) HFA Inhaler 1 Puff(s) Inhalation every 4 hours  ciprofloxacin   IVPB 400 milliGRAM(s) IV Intermittent every 12 hours  heparin  Injectable 5000 Unit(s) SubCutaneous every 8 hours  hydrochlorothiazide 12.5 milliGRAM(s) Oral every other day  metroNIDAZOLE  IVPB 500 milliGRAM(s) IV Intermittent every 8 hours  sodium chloride 0.9% with potassium chloride 20 mEq/L 1000 milliLiter(s) (100 mL/Hr) IV Continuous <Continuous>  sodium chloride 0.9%. 1000 milliLiter(s) (500 mL/Hr) IV Continuous <Continuous>    MEDICATIONS  (PRN):  acetaminophen   Tablet 650 milliGRAM(s) Oral every 6 hours PRN For Temp greater than 38 C (100.4 F)  morphine  - Injectable 2 milliGRAM(s) IV Push every 6 hours PRN Moderate Pain (4 - 6)  morphine  - Injectable 2 milliGRAM(s) IV Push every 4 hours PRN Severe Pain (7 - 10)  ondansetron Injectable 4 milliGRAM(s) IV Push every 6 hours PRN Nausea      FAMILY HISTORY:  neg for colon cancer or polyps    Social History:    REVIEW OF SYSTEMS      General:	No fever or chills    Skin/Breast: No jaundice or rash   		  ENMT: denies sore throat or thrush    Respiratory and Thorax: Denies dyspnea or cough or shortness of breath  	  Cardiovascular: Denies chest pain or palpitations 	    Gastrointestinal: Denies jaundice or pruritis    Genitourinary: Denies dysuria or hematuria	    Musculoskeletal: Denies muscular pain or swelling	    Neurological: Denies confusion or tremor	    Hematology/Lymphatics: Denies easy bruising or bleeding 	    Endocrine:	Denies polyphagia or polyuria    See above hx otherwise neg for any major organ systems    PHYSICAL EXAM:    Vital Signs Last 24 Hrs  T(C): 37.1 (08 Oct 2017 05:37), Max: 37.6 (07 Oct 2017 16:37)  T(F): 98.8 (08 Oct 2017 05:37), Max: 99.6 (07 Oct 2017 16:37)  HR: 88 (08 Oct 2017 07:49) (80 - 100)  BP: 145/94 (08 Oct 2017 05:37) (121/89 - 145/94)  BP(mean): 95 (07 Oct 2017 15:16) (95 - 95)  RR: 18 (08 Oct 2017 05:37) (18 - 18)  SpO2: 99% (08 Oct 2017 05:37) (98% - 100%)    Constitutional: no acute distress    ENMT: NC/AT scl anicteric opm pink no lesions     Neck: supple. No jvd or LN    Respiratory: Clear     Cardiovascular: RRR s1s2     Gastrointestinal: Pos bs , soft , mild tender LLQ, mild distension,  no hepatosplenomegaly,  no mass        Back: No CVA tenderness    Extremities: NO cce     Neurological: Alert and oriented x 3     Skin: No rash or jaundice     Date/Time:10-08 @ 06:18    Albumin: --  ALT/SGPT: --  Alk Phos: --  AST/SGOT: --  Bilirubin Direct: --  Bilirubin Total: --  Ca: 8.7  eGFR : 95  eGFR Non-: 82  Lipase: --  Amylase: --  INR: --  PTT: --  Date/Time:10-07 @ 16:04    Albumin: 4.1  ALT/SGPT: 78  Alk Phos: 213  AST/SGOT: 69  Bilirubin Direct: --  Bilirubin Total: 0.5  Ca: 10.1  eGFR : 59  eGFR Non-: 51  Lipase: 240  Amylase: --  INR: --  PTT: --      10-08    136  |  98  |  20  ----------------------------<  104<H>  3.0<L>   |  28  |  0.83    Ca    8.7      08 Oct 2017 06:18  Mg     2.5     10-08    TPro  9.8<H>  /  Alb  4.1  /  TBili  0.5  /  DBili  x   /  AST  69<H>  /  ALT  78  /  AlkPhos  213<H>  10-07                            13.1   4.3   )-----------( 168      ( 08 Oct 2017 06:18 )             37.9         < from: CT Abdomen and Pelvis w/ Oral Cont and w/ IV Cont (10.07.17 @ 17:32) >    EXAM:  CT ABDOMEN AND PELVIS OC IC                            PROCEDURE DATE:  10/07/2017          INTERPRETATION:      CT Abdomen and Pelvis with IV contrast        CLINICAL INFORMATION:  Lower  Abdominal pain and tenderness. Nausea   vomiting    TECHNIQUE: Contiguous axial 3 mm images were obtained from a single   helical acquisition through the abdomen and pelvis during the intravenous   administration of 95 cc of Omnipaque 350/ 5 cc discarded.  Imaging post   processing software was employed to generate reformatted images in 3 mm   sagittal and coronal  planes.      Oral contrast was administered.  This   scan was performed using automatic exposure control (radiation dose   reduction software) to obtain a diagnostic image quality scan with   patient dose as low as reasonably achievable.         FINDINGS:   No previous examinations are available for review.    The lung bases are clear.         The liver demonstrates homogeneous attenuation with 3 mm hypodensity in   the RIGHT lobe, too small to characterize.  Hepatic size and contours are   maintained. Hepatic and portal veins are patent and not displaced.  No   intrahepatic or common ductal dilatation is recognized.  The gallbladder   is intact without calcified calculi or wall thickening.  The pancreas is   intact without ductal dilatation or focal lesion.  The spleen is normal   in size.    The adrenal glands are intact.  The kidneys demonstrate symmetric   nephrograms.   No suspicious renal mass is found.  No renal calculus,   hydronephrosis or perinephric infiltration is found.  The ureters are not   dilated.   The bladder appears unremarkable.        No enlarged lymph nodes are found.   No ascites is present.   The osseous   structures are intact.         The bowel demonstrates mucosal thickening involving the distal descending   and proximal and mid sigmoid colon consistent with colitis. Moderate   diverticulosis involves the sigmoid colon without focal diverticulitis..    No obstruction, perforation or abscessis recognized.           IMPRESSION: mucosal thickening involving the distal descending and   proximal and mid sigmoid colon consistent with colitis.                              ALEX MARTINEZ M.D., ATTENDING RADIOLOGIST  This document has been electronically signed. Oct  7 2017  5:34PM                < end of copied text >

## 2017-10-09 RX ORDER — HYDROCHLOROTHIAZIDE 25 MG
12.5 TABLET ORAL EVERY OTHER DAY
Qty: 0 | Refills: 0 | Status: DISCONTINUED | OUTPATIENT
Start: 2017-10-09 | End: 2017-10-10

## 2017-10-09 RX ADMIN — Medication 100 MILLIGRAM(S): at 21:30

## 2017-10-09 RX ADMIN — Medication 12.5 MILLIGRAM(S): at 15:03

## 2017-10-09 RX ADMIN — HEPARIN SODIUM 5000 UNIT(S): 5000 INJECTION INTRAVENOUS; SUBCUTANEOUS at 05:42

## 2017-10-09 RX ADMIN — Medication 200 MILLIGRAM(S): at 17:37

## 2017-10-09 RX ADMIN — MORPHINE SULFATE 2 MILLIGRAM(S): 50 CAPSULE, EXTENDED RELEASE ORAL at 18:26

## 2017-10-09 RX ADMIN — Medication 100 MILLIGRAM(S): at 05:41

## 2017-10-09 RX ADMIN — Medication 100 MILLIGRAM(S): at 14:32

## 2017-10-09 RX ADMIN — Medication 200 MILLIGRAM(S): at 05:41

## 2017-10-09 RX ADMIN — ONDANSETRON 4 MILLIGRAM(S): 8 TABLET, FILM COATED ORAL at 18:39

## 2017-10-09 NOTE — PROGRESS NOTE ADULT - ASSESSMENT
Colitis improved    await stool culture  stool c diff neg    cipro and flagyl for 1 week    low residue diet  follow up with gastroenterologist in her plan or myself in 2 weeks and advised her to have colonoscopy  as outpt as she is interested in being discharged.

## 2017-10-09 NOTE — PROGRESS NOTE ADULT - SUBJECTIVE AND OBJECTIVE BOX
Chief Complaint:  left lower abd discomfort improved  few loose bm's  no blood in stoo  no fever or chills  no n/v  chelsie diet solid       Past Med Hx  1) HTN on HCTZ  2) Asthma was intubated 3 times, last in 1990;  uses pump 2 times a day  3) Diverticululitis that also presetned w N/V  4) R Fibula fx after mechanical fall; had ORIF here w Dr. Bella  5) Sickle cell trait    Past Surg Hx  1) R distal fibula ORIF   2) Adenoidectomy 1982  3) C section x4  1994, 1995, 1998, 1999  4) Tubal ligation 1999    Fam Hx neg in 1st degree relatives (07 Oct 2017 21:23)      Allergies    aspirin (Short breath)    Intolerances        MEDICATIONS  (STANDING):  ALBUTerol    0.083% 2.5 milliGRAM(s) Nebulizer every 6 hours  ALBUTerol    90 MICROgram(s) HFA Inhaler 1 Puff(s) Inhalation every 4 hours  ciprofloxacin   IVPB 400 milliGRAM(s) IV Intermittent every 12 hours  heparin  Injectable 5000 Unit(s) SubCutaneous every 8 hours  hydrochlorothiazide 12.5 milliGRAM(s) Oral every other day  metroNIDAZOLE  IVPB 500 milliGRAM(s) IV Intermittent every 8 hours    MEDICATIONS  (PRN):  acetaminophen   Tablet 650 milliGRAM(s) Oral every 6 hours PRN For Temp greater than 38 C (100.4 F)  morphine  - Injectable 2 milliGRAM(s) IV Push every 6 hours PRN Moderate Pain (4 - 6)  morphine  - Injectable 2 milliGRAM(s) IV Push every 4 hours PRN Severe Pain (7 - 10)  ondansetron Injectable 4 milliGRAM(s) IV Push every 6 hours PRN Nausea  zolpidem 5 milliGRAM(s) Oral at bedtime PRN Insomnia      REVIEW OF SYSTEMS      General:	No fever or chills    Skin/Breast: No jaundice or rash   		  ENMT: denies sore throat or thrush    Respiratory and Thorax: Denies dyspnea or cough or shortness of breath  	  Cardiovascular: Denies chest pain or palpitations 	    Gastrointestinal: Denies jaundice or pruritis    Genitourinary: Denies dysuria or hematuria	    Musculoskeletal: Denies muscular pain or swelling	    Neurological: Denies confusion or tremor	    Hematology/Lymphatics: Denies easy bruising or bleeding 	    Endocrine:	Denies polyphagia or polyuria    See above hx otherwise neg for any major organ systems    PHYSICAL EXAM:    Vital Signs Last 24 Hrs  T(C): 37 (09 Oct 2017 11:17), Max: 37.6 (09 Oct 2017 05:43)  T(F): 98.6 (09 Oct 2017 11:17), Max: 99.6 (09 Oct 2017 05:43)  HR: 78 (09 Oct 2017 11:17) (78 - 89)  BP: 138/80 (09 Oct 2017 11:17) (136/89 - 148/76)  BP(mean): --  RR: 18 (09 Oct 2017 11:17) (18 - 18)  SpO2: 100% (09 Oct 2017 11:17) (95% - 100%)    Constitutional: no acute distress    ENMT: NC/AT scl anicteric opm pink no lesions     Neck: supple. No jvd or LN    Respiratory: Clear     Cardiovascular: RRR s1s2     Gastrointestinal: Pos bs , soft , not tender, no hepatosplenomegaly,  no mass      Back: No CVA tenderness    Extremities: NO cce     Neurological: Alert and oriented x 3     Skin: No rash or jaundice    Date/Time:10-08 @ 06:18    ALT/SGPT: --  Albumin: --  AST/SGOT: --  Bilirubin Direct: --  Bilirubin Total: --  Ca: 8.7  eGFR : 95  eGFR Non-: 82  Lipase: --  Amylase: --  INR: --  PTT: --        Date/Time:10-07 @ 16:04    ALT/SGPT: 78  Albumin: 4.1  AST/SGOT: 69  Bilirubin Direct: --  Bilirubin Total: 0.5  Ca: 10.1  eGFR : 59  eGFR Non-: 51  Lipase: 240  Amylase: --  INR: --  PTT: --            10-08    136  |  98  |  20  ----------------------------<  104<H>  3.0<L>   |  28  |  0.83    Ca    8.7      08 Oct 2017 06:18  Mg     2.5     10-08    TPro  9.8<H>  /  Alb  4.1  /  TBili  0.5  /  DBili  x   /  AST  69<H>  /  ALT  78  /  AlkPhos  213<H>  10-07                            13.1   4.3   )-----------( 168      ( 08 Oct 2017 06:18 )             37.9

## 2017-10-09 NOTE — PROGRESS NOTE ADULT - SUBJECTIVE AND OBJECTIVE BOX
CHIEF COMPLAINT/Diagnosis: colitis    SUBJECTIVE: no complaints    REVIEW OF SYSTEMS:    CONSTITUTIONAL: No weakness, fevers or chills  EYES/ENT: No visual changes;  No vertigo or throat pain   NECK: No pain or stiffness  RESPIRATORY: No cough, wheezing, hemoptysis; No shortness of breath  CARDIOVASCULAR: No chest pain or palpitations  GASTROINTESTINAL: No abdominal or epigastric pain. No nausea, vomiting, or hematemesis; No diarrhea or constipation. No melena or hematochezia.  GENITOURINARY: No dysuria, frequency or hematuria  NEUROLOGICAL: No numbness or weakness  SKIN: No itching, burning, rashes, or lesions   All other review of systems is negative unless indicated above    Vital Signs Last 24 Hrs  T(C): 37 (09 Oct 2017 11:17), Max: 37.6 (09 Oct 2017 05:43)  T(F): 98.6 (09 Oct 2017 11:17), Max: 99.6 (09 Oct 2017 05:43)  HR: 78 (09 Oct 2017 11:17) (78 - 89)  BP: 138/80 (09 Oct 2017 11:17) (136/89 - 148/76)  BP(mean): --  RR: 18 (09 Oct 2017 11:17) (18 - 18)  SpO2: 100% (09 Oct 2017 11:17) (95% - 100%)    I&O's Summary      CAPILLARY BLOOD GLUCOSE          PHYSICAL EXAM:    Constitutional: NAD, awake and alert, well-developed  HEENT: PERR, EOMI, Normal Hearing, MMM  Neck: Soft and supple, No LAD, No JVD  Respiratory: Breath sounds are clear bilaterally, No wheezing, rales or rhonchi  Cardiovascular: S1 and S2, regular rate and rhythm, no Murmurs, gallops or rubs  Gastrointestinal: Bowel Sounds present, soft, nontender, nondistended, no guarding, no rebound  Extremities: No peripheral edema  Vascular: 2+ peripheral pulses  Neurological: A/O x 3, no focal deficits  Musculoskeletal: 5/5 strength b/l upper and lower extremities  Skin: No rashes    MEDICATIONS:  MEDICATIONS  (STANDING):  ALBUTerol    0.083% 2.5 milliGRAM(s) Nebulizer every 6 hours  ALBUTerol    90 MICROgram(s) HFA Inhaler 1 Puff(s) Inhalation every 4 hours  ciprofloxacin   IVPB 400 milliGRAM(s) IV Intermittent every 12 hours  heparin  Injectable 5000 Unit(s) SubCutaneous every 8 hours  metroNIDAZOLE  IVPB 500 milliGRAM(s) IV Intermittent every 8 hours      LABS: All Labs Reviewed:                        13.1   4.3   )-----------( 168      ( 08 Oct 2017 06:18 )             37.9     10-08    136  |  98  |  20  ----------------------------<  104<H>  3.0<L>   |  28  |  0.83    Ca    8.7      08 Oct 2017 06:18  Mg     2.5     10-08    TPro  9.8<H>  /  Alb  4.1  /  TBili  0.5  /  DBili  x   /  AST  69<H>  /  ALT  78  /  AlkPhos  213<H>  10-07          Blood Culture: 10-08 @ 22:50  Organism --  Gram Stain Blood -- Gram Stain --  Specimen Source .Stool Feces  Culture-Blood --          Assessment and Plan    52 yr old black female w HTN, asthma, hx diverticulitis 3 x (previoulsy treated at [facility]; previously refused colonoscopy) , presented to  ED c/o N/V,  abdominal pain, and 1-2 episodes of diahrea.  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    1-Acute colitis  -c.diff negative  -no luekocytosis, and patient is afebrile.   -c/w cipro/flagyl  -CT scan abd/pelvis: IMPRESSION: mucosal thickening involving the distal descending and   proximal and mid sigmoid colon consistent with colitis.   -patient is now aggreeable to having a colonoscopy; will refer to outpatient GI services for colonosocpy in few weeks.  -patient had another episode of vommitting in the early morning hours. Diahrea is minmal. basically loose stool. Still feels nauseated.   -tolerated full liquid diet well; states she is hungry; advance diet to low fiber. Will see how tolerates the regular consistency foods;   -anticipated dc [facility]    2- Hypokalemia  -3.0 , supplemented  -repeat in AM    3- Asthma  -c/w prn nebs    4-HTN  -restart home dose of hydrochlorothiazide    5-DVT proph- sc heparin

## 2017-10-09 NOTE — CDI QUERY NOTE - NSCDIOTHERTXTBX_GEN_ALL_CORE_HH
Patient's Sodium level on admission was 133. Please render your clinical opinion if this is A) Hyponatremia   B) not Hyponatremia C)Other ( Please specify condition)

## 2017-10-10 ENCOUNTER — TRANSCRIPTION ENCOUNTER (OUTPATIENT)
Age: 52
End: 2017-10-10

## 2017-10-10 VITALS
TEMPERATURE: 98 F | HEART RATE: 89 BPM | DIASTOLIC BLOOD PRESSURE: 89 MMHG | RESPIRATION RATE: 18 BRPM | SYSTOLIC BLOOD PRESSURE: 129 MMHG | OXYGEN SATURATION: 95 %

## 2017-10-10 LAB
ANION GAP SERPL CALC-SCNC: 9 MMOL/L — SIGNIFICANT CHANGE UP (ref 5–17)
BUN SERPL-MCNC: 9 MG/DL — SIGNIFICANT CHANGE UP (ref 7–23)
CALCIUM SERPL-MCNC: 9.1 MG/DL — SIGNIFICANT CHANGE UP (ref 8.5–10.1)
CHLORIDE SERPL-SCNC: 98 MMOL/L — SIGNIFICANT CHANGE UP (ref 96–108)
CO2 SERPL-SCNC: 26 MMOL/L — SIGNIFICANT CHANGE UP (ref 22–31)
CREAT SERPL-MCNC: 0.63 MG/DL — SIGNIFICANT CHANGE UP (ref 0.5–1.3)
GLUCOSE SERPL-MCNC: 112 MG/DL — HIGH (ref 70–99)
HCT VFR BLD CALC: 39 % — SIGNIFICANT CHANGE UP (ref 34.5–45)
HGB BLD-MCNC: 12.8 G/DL — SIGNIFICANT CHANGE UP (ref 11.5–15.5)
MCHC RBC-ENTMCNC: 29 PG — SIGNIFICANT CHANGE UP (ref 27–34)
MCHC RBC-ENTMCNC: 32.8 GM/DL — SIGNIFICANT CHANGE UP (ref 32–36)
MCV RBC AUTO: 88.6 FL — SIGNIFICANT CHANGE UP (ref 80–100)
PLATELET # BLD AUTO: 203 K/UL — SIGNIFICANT CHANGE UP (ref 150–400)
POTASSIUM SERPL-MCNC: 3.6 MMOL/L — SIGNIFICANT CHANGE UP (ref 3.5–5.3)
POTASSIUM SERPL-SCNC: 3.6 MMOL/L — SIGNIFICANT CHANGE UP (ref 3.5–5.3)
RBC # BLD: 4.41 M/UL — SIGNIFICANT CHANGE UP (ref 3.8–5.2)
RBC # FLD: 13 % — SIGNIFICANT CHANGE UP (ref 10.3–14.5)
SODIUM SERPL-SCNC: 133 MMOL/L — LOW (ref 135–145)
WBC # BLD: 4.8 K/UL — SIGNIFICANT CHANGE UP (ref 3.8–10.5)
WBC # FLD AUTO: 4.8 K/UL — SIGNIFICANT CHANGE UP (ref 3.8–10.5)

## 2017-10-10 RX ORDER — MOXIFLOXACIN HYDROCHLORIDE TABLETS, 400 MG 400 MG/1
1 TABLET, FILM COATED ORAL
Qty: 14 | Refills: 0 | OUTPATIENT
Start: 2017-10-10 | End: 2017-10-17

## 2017-10-10 RX ORDER — METRONIDAZOLE 500 MG
1 TABLET ORAL
Qty: 21 | Refills: 0 | OUTPATIENT
Start: 2017-10-10 | End: 2017-10-17

## 2017-10-10 RX ADMIN — Medication 200 MILLIGRAM(S): at 05:37

## 2017-10-10 RX ADMIN — Medication 100 MILLIGRAM(S): at 05:36

## 2017-10-10 NOTE — DISCHARGE NOTE ADULT - MEDICATION SUMMARY - MEDICATIONS TO STOP TAKING
I will STOP taking the medications listed below when I get home from the hospital:    enoxaparin 40 mg/0.4 mL injectable solution  -- 40 milligram(s) injectable once a day for dvt ppx  -- It is very important that you take or use this exactly as directed.  Do not skip doses or discontinue unless directed by your doctor.    Keflex 500 mg oral capsule  -- 1 cap(s) by mouth 3 times a day anc ppx  -- Finish all this medication unless otherwise directed by prescriber.    Lovenox 40 mg/0.4 mL injectable solution  -- 40 milligram(s) injectable once a day MDD:40mg  take as directed by anticoagualtion center.  -- It is very important that you take or use this exactly as directed.  Do not skip doses or discontinue unless directed by your doctor.

## 2017-10-10 NOTE — DISCHARGE NOTE ADULT - CARE PLAN
Principal Discharge DX:	Colitis, acute  Goal:	continue with antibiotics for another 7 days; f/u outpatient w/ GI services  Instructions for follow-up, activity and diet:	Continue with low fiber, soft consistency foods for the next 4-5 days; avoid nuts

## 2017-10-10 NOTE — DISCHARGE NOTE ADULT - HOSPITAL COURSE
Vital Signs Last 24 Hrs  T(C): 36.9 (10 Oct 2017 05:40), Max: 37.1 (09 Oct 2017 21:30)  T(F): 98.5 (10 Oct 2017 05:40), Max: 98.7 (09 Oct 2017 21:30)  HR: 90 (10 Oct 2017 05:40) (72 - 90)  BP: 128/91 (10 Oct 2017 05:40) (128/91 - 138/80)  BP(mean): --  RR: 18 (10 Oct 2017 05:40) (17 - 18)  SpO2: 98% (10 Oct 2017 05:40) (98% - 100%)    HEENT:   pupils equal and reactive, EOMI, no oropharyngeal lesions, erythema, exudates, oral thrush    NECK:   supple, no carotid bruits, no palpable lymph nodes, no thyromegaly    CV:  +S1, +S2, regular, no murmurs or rubs    RESP:   lungs clear to auscultation bilaterally, no wheezing, rales, rhonchi, good air entry bilaterally    BREAST:  not examined    GI:  abdomen soft, non-tender, non-distended, normal BS, no bruits, no abdominal masses, no palpable masses    RECTAL:  not examined    :  not examined    MSK:   normal muscle tone, no atrophy, no rigidity, no contractions    EXT:   no clubbing, no cyanosis, no edema, no calf pain, swelling or erythema    VASCULAR:  pulses equal and symmetric in the upper and lower extremities    NEURO:  AAOX3, no focal neurological deficits, follows all commands, able to move extremities spontaneously    SKIN:  no ulcers, lesions or rashes    10 Oct 2017 06:17    133    |  98     |  9      ----------------------------<  112    3.6     |  26     |  0.63     Ca    9.1        10 Oct 2017 06:17    CBC Full  -  ( 10 Oct 2017 06:17 )  WBC Count : 4.8 K/uL  Hemoglobin : 12.8 g/dL  Hematocrit : 39.0 %  Platelet Count - Automated : 203 K/uL  Mean Cell Volume : 88.6 fl  Mean Cell Hemoglobin : 29.0 pg  Mean Cell Hemoglobin Concentration : 32.8 gm/dL      Assessment and Plan    52 yr old black female w HTN, asthma, hx diverticulitis 3 x (previoulsy treated at [facility]; previously refused colonoscopy) , presented to  ED c/o N/V,  abdominal pain, and 1-2 episodes of diahrea.  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    1-Acute colitis  -c.diff negative  -no luekocytosis, and patient is afebrile.   -c/w cipro/flagyl  -CT scan abd/pelvis: IMPRESSION: mucosal thickening involving the distal descending and   proximal and mid sigmoid colon consistent with colitis.   -patient is now aggreeable to having a colonoscopy; will refer to outpatient GI services for colonosocpy in few weeks.  -patient had another episode of vommitting in the early morning hours. Diahrea is minmal. basically loose stool. Still feels nauseated.   -tolerated full liquid diet well; states she is hungry; advance diet to low fiber. Will see how tolerates the regular consistency foods;   -anticipated nicole montero Vital Signs Last 24 Hrs  T(C): 36.9 (10 Oct 2017 05:40), Max: 37.1 (09 Oct 2017 21:30)  T(F): 98.5 (10 Oct 2017 05:40), Max: 98.7 (09 Oct 2017 21:30)  HR: 90 (10 Oct 2017 05:40) (72 - 90)  BP: 128/91 (10 Oct 2017 05:40) (128/91 - 138/80)  BP(mean): --  RR: 18 (10 Oct 2017 05:40) (17 - 18)  SpO2: 98% (10 Oct 2017 05:40) (98% - 100%)    HEENT:   pupils equal and reactive, EOMI, no oropharyngeal lesions, erythema, exudates, oral thrush    NECK:   supple, no carotid bruits, no palpable lymph nodes, no thyromegaly    CV:  +S1, +S2, regular, no murmurs or rubs    RESP:   lungs clear to auscultation bilaterally, no wheezing, rales, rhonchi, good air entry bilaterally    BREAST:  not examined    GI:  abdomen soft, non-tender, non-distended, normal BS, no bruits, no abdominal masses, no palpable masses    RECTAL:  not examined    :  not examined    MSK:   normal muscle tone, no atrophy, no rigidity, no contractions    EXT:   no clubbing, no cyanosis, no edema, no calf pain, swelling or erythema    VASCULAR:  pulses equal and symmetric in the upper and lower extremities    NEURO:  AAOX3, no focal neurological deficits, follows all commands, able to move extremities spontaneously    SKIN:  no ulcers, lesions or rashes    10 Oct 2017 06:17    133    |  98     |  9      ----------------------------<  112    3.6     |  26     |  0.63     Ca    9.1        10 Oct 2017 06:17    CBC Full  -  ( 10 Oct 2017 06:17 )  WBC Count : 4.8 K/uL  Hemoglobin : 12.8 g/dL  Hematocrit : 39.0 %  Platelet Count - Automated : 203 K/uL  Mean Cell Volume : 88.6 fl  Mean Cell Hemoglobin : 29.0 pg  Mean Cell Hemoglobin Concentration : 32.8 gm/dL      Assessment and Plan    52 yr old black female w HTN, asthma, hx diverticulitis 3 x (previoulsy treated at [facility]; previously refused colonoscopy) , presented to  ED c/o N/V,  abdominal pain, and 1-2 episodes of diahrea.  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 C.diff was negative. She was started on abx for likely bacterial colitis, cipr/flagyl. Patient had near resolution of her abdominal pains, but insist on continue with regular consitency oral intake. She has been educated on continuing with soft consistency low fiber foods for the next 4-5 days, with avoidence of nuts and hard foods. Advised to c/w oral hydration. She verbalizes understanding of plan and to c/w abx for the next 7 days.  She will need colonoscopy outpatient in few weeks. She previously was not optimistic about having a colonoscopy but now understands that she must have this.     Dr. Waqas Pittman,  GI  Phone: [phone]

## 2017-10-10 NOTE — DISCHARGE NOTE ADULT - PATIENT PORTAL LINK FT
“You can access the [facility] Patient Portal, offered by [facility], by registering with the following website: [email or web address]”

## 2017-10-10 NOTE — DISCHARGE NOTE ADULT - MEDICATION SUMMARY - MEDICATIONS TO TAKE
I will START or STAY ON the medications listed below when I get home from the hospital:    Flagyl 500 mg oral tablet  -- 1 tab(s) by mouth 3 times a day   -- Do not drink alcoholic beverages when taking this medication.  Finish all this medication unless otherwise directed by prescriber.  May discolor urine or feces.    -- Indication: For COLITIS    Ventolin HFA 90 mcg/inh inhalation aerosol  -- 2 puff(s) inhaled 4 times a day, As Needed  -- Indication: For asthma    Cipro 500 mg oral tablet  -- 1 tab(s) by mouth 2 times a day   -- Avoid prolonged or excessive exposure to direct and/or artificial sunlight while taking this medication.  Check with your doctor before becoming pregnant.  Do not take dairy products, antacids, or iron preparations within one hour of this medication.  Finish all this medication unless otherwise directed by prescriber.  Medication should be taken with plenty of water.    -- Indication: For COLITIS

## 2017-10-10 NOTE — DISCHARGE NOTE ADULT - PLAN OF CARE
continue with antibiotics for another 7 days; f/u outpatient w/ GI services Continue with low fiber, soft consistency foods for the next 4-5 days; avoid nuts

## 2017-10-12 DIAGNOSIS — I10 ESSENTIAL (PRIMARY) HYPERTENSION: ICD-10-CM

## 2017-10-12 DIAGNOSIS — K52.9 NONINFECTIVE GASTROENTERITIS AND COLITIS, UNSPECIFIED: ICD-10-CM

## 2017-10-12 DIAGNOSIS — E87.6 HYPOKALEMIA: ICD-10-CM

## 2017-10-12 DIAGNOSIS — J45.909 UNSPECIFIED ASTHMA, UNCOMPLICATED: ICD-10-CM

## 2017-10-12 DIAGNOSIS — A09 INFECTIOUS GASTROENTERITIS AND COLITIS, UNSPECIFIED: ICD-10-CM

## 2017-10-12 LAB
CULTURE RESULTS: SIGNIFICANT CHANGE UP
SPECIMEN SOURCE: SIGNIFICANT CHANGE UP

## 2018-03-22 NOTE — PATIENT PROFILE ADULT. - CENTRAL VENOUS CATHETER
Final Anesthesia Post-op Assessment    Patient: Jo Peralta  Procedure(s) Performed: BILATERAL MYRINGOTOMY WITH TUBE PLACEMENTCIRCUMCISION  Anesthesia type: General    Vital Last Value   Temperature 37.3 °C (99.2 °F) (03/22/18 0849)   Pulse 152 (03/22/18 0849)   Respiratory Rate 28 (03/22/18 0849)   Non-Invasive   Blood Pressure (!) 140/71 (03/22/18 0849)   Arterial  Blood Pressure     Pulse Oximetry 95 % (03/22/18 0849)     Last 24 I/O:   Intake/Output Summary (Last 24 hours) at 03/22/18 0924  Last data filed at 03/22/18 0800   Gross per 24 hour   Intake              100 ml   Output                0 ml   Net              100 ml       PATIENT LOCATION: PACU Phase 2  POST-OP VITAL SIGNS: stable  LEVEL OF CONSCIOUSNESS: participates in exam, answers questions appropriately, awake, alert and oriented  RESPIRATORY STATUS: spontaneous ventilation  CARDIOVASCULAR: blood pressure returned to baseline and stable  HYDRATION: euvolemic    PAIN MANAGEMENT: adequately controlled  NAUSEA: None  AIRWAY PATENCY: patent  POST-OP ASSESSMENT: no complications, patient tolerated procedure well with no complications, no evidence of recall and sufficiently recovered from acute administration of anesthesia effects and able to participate in evaluation  COMPLICATIONS: none       no

## 2018-04-30 RX ORDER — ALBUTEROL 90 UG/1
2 AEROSOL, METERED ORAL
Qty: 0 | Refills: 0 | COMMUNITY

## 2018-04-30 RX ORDER — DOCUSATE SODIUM 100 MG
1 CAPSULE ORAL
Qty: 0 | Refills: 0 | COMMUNITY

## 2018-07-26 PROBLEM — I10 ESSENTIAL (PRIMARY) HYPERTENSION: Chronic | Status: ACTIVE | Noted: 2017-01-31

## 2018-07-26 PROBLEM — S82.409A UNSPECIFIED FRACTURE OF SHAFT OF UNSPECIFIED FIBULA, INITIAL ENCOUNTER FOR CLOSED FRACTURE: Chronic | Status: ACTIVE | Noted: 2017-02-14

## 2018-07-26 PROBLEM — D64.9 ANEMIA, UNSPECIFIED: Chronic | Status: ACTIVE | Noted: 2017-02-14

## 2018-07-26 PROBLEM — M25.571 PAIN IN RIGHT ANKLE AND JOINTS OF RIGHT FOOT: Chronic | Status: ACTIVE | Noted: 2017-02-14

## 2018-07-26 PROBLEM — D57.3 SICKLE-CELL TRAIT: Chronic | Status: ACTIVE | Noted: 2017-02-14

## 2018-07-26 PROBLEM — H00.014 HORDEOLUM EXTERNUM LEFT UPPER EYELID: Chronic | Status: ACTIVE | Noted: 2017-02-14

## 2018-07-26 PROBLEM — S82.209A UNSPECIFIED FRACTURE OF SHAFT OF UNSPECIFIED TIBIA, INITIAL ENCOUNTER FOR CLOSED FRACTURE: Chronic | Status: ACTIVE | Noted: 2017-02-14

## 2018-07-26 PROBLEM — R73.03 PREDIABETES: Chronic | Status: ACTIVE | Noted: 2017-01-31

## 2018-07-26 PROBLEM — J33.9 NASAL POLYP, UNSPECIFIED: Chronic | Status: ACTIVE | Noted: 2017-02-14

## 2018-07-26 PROBLEM — F41.9 ANXIETY DISORDER, UNSPECIFIED: Chronic | Status: ACTIVE | Noted: 2017-02-14

## 2018-07-26 PROBLEM — I10 ESSENTIAL (PRIMARY) HYPERTENSION: Chronic | Status: ACTIVE | Noted: 2017-10-08

## 2018-08-23 ENCOUNTER — APPOINTMENT (OUTPATIENT)
Dept: PULMONOLOGY | Facility: CLINIC | Age: 53
End: 2018-08-23

## 2019-03-05 NOTE — ED ADULT NURSE NOTE - CHIEF COMPLAINT QUOTE
[address]        NAME: Santi Lopez is a 17 y o  female  : 2003    MRN: [id number]  DATE: 2019  TIME: 3:11 PM    Assessment and Plan   Strep pharyngitis [J02 0]  1  Strep pharyngitis  lidocaine viscous (XYLOCAINE) 2 % mucosal solution    azithromycin (ZITHROMAX) 250 mg tablet   2  Sore throat  Throat culture    POCT rapid strepA         Patient Instructions     Take azithromycin and lidocaine swishes as prescribed  Boil toothbrush each night and replace after 2-3 of treatment  Fluids and rest  Salt water gargles   Throat Coat Tea  Wash hands frequently  Don't share drinks  Tylenol/Ibuprofen for pain/fever  Follow up with PCP in 3-5 days  Proceed to  ER if symptoms worsen  Chief Complaint     Chief Complaint   Patient presents with    Sore Throat     Pt c/o sore throat as of today  History of Present Illness       Sore Throat   This is a new problem  The current episode started yesterday  The problem occurs constantly  The problem has been unchanged  Associated symptoms include a fever and a sore throat  Pertinent negatives include no abdominal pain, chest pain, chills, congestion, coughing, headaches, myalgias, nausea, rash, vomiting or weakness  She has tried nothing for the symptoms  Review of Systems   Review of Systems   Constitutional: Positive for fever  Negative for activity change, appetite change and chills  HENT: Positive for sore throat  Negative for congestion, dental problem, ear discharge, ear pain, facial swelling, postnasal drip, rhinorrhea, sinus pressure, sinus pain, sneezing and trouble swallowing  Eyes: Negative for itching  Respiratory: Negative for cough and shortness of breath  Cardiovascular: Negative for chest pain and palpitations  Gastrointestinal: Negative for abdominal pain, constipation, diarrhea, nausea and vomiting  Musculoskeletal: Negative for myalgias  Skin: Negative for rash     Neurological: Negative for "My foot hurts. I broke it on Jan 31st" dizziness, weakness, light-headedness and headaches  Current Medications       Current Outpatient Medications:     acetaminophen (TYLENOL) 325 mg tablet, Take 650 mg by mouth every 6 (six) hours as needed for mild pain, Disp: , Rfl:     azithromycin (ZITHROMAX) 250 mg tablet, Take 2 tablets today then 1 tablet daily x 4 days, Disp: 6 tablet, Rfl: 0    lidocaine viscous (XYLOCAINE) 2 % mucosal solution, Swish and spit 15 mL 4 (four) times a day as needed for mild pain, Disp: 100 mL, Rfl: 0    loratadine (CLARITIN) 10 mg tablet, Take 10 mg by mouth daily, Disp: , Rfl:     Current Allergies     Allergies as of 03/05/2019 - Reviewed 03/05/2019   Allergen Reaction Noted    Penicillins Hives 05/28/2018            The following portions of the patient's history were reviewed and updated as appropriate: allergies, current medications, past family history, past medical history, past social history, past surgical history and problem list      History reviewed  No pertinent past medical history  History reviewed  No pertinent surgical history  History reviewed  No pertinent family history  Medications have been verified  Objective   Pulse (!) 130   Temp (!) 99 8 °F (37 7 °C)   Resp 18   Ht 5' 3" (1 6 m)   Wt 80 3 kg (177 lb)   LMP 02/16/2019 (Approximate)   SpO2 99%   BMI 31 35 kg/m²        Physical Exam     Physical Exam   Constitutional: She is oriented to person, place, and time  She appears well-developed and well-nourished  No distress  HENT:   Head: Normocephalic and atraumatic  Right Ear: Tympanic membrane and external ear normal    Left Ear: Tympanic membrane and external ear normal    Mouth/Throat: Oropharyngeal exudate, posterior oropharyngeal edema and posterior oropharyngeal erythema present  Tonsillar exudate  Posterior pharynx erythematous with tonsillar hypertrophy but without exudate  Eyes: Pupils are equal, round, and reactive to light   Right eye exhibits no discharge  Cardiovascular: Normal rate, regular rhythm and normal heart sounds  Exam reveals no gallop and no friction rub  No murmur heard  Pulmonary/Chest: Effort normal  No respiratory distress  She has no wheezes  She has no rales  She exhibits no tenderness  Abdominal: Soft  There is no tenderness  Lymphadenopathy:     She has cervical adenopathy (anterior)  Neurological: She is alert and oriented to person, place, and time  Skin: Skin is warm and dry  No rash noted  Psychiatric: She has a normal mood and affect   Her behavior is normal  Judgment and thought content normal

## 2019-05-22 ENCOUNTER — APPOINTMENT (OUTPATIENT)
Dept: PULMONOLOGY | Facility: CLINIC | Age: 54
End: 2019-05-22
Payer: MEDICAID

## 2019-05-22 VITALS
OXYGEN SATURATION: 95 % | HEART RATE: 83 BPM | WEIGHT: 180 LBS | SYSTOLIC BLOOD PRESSURE: 125 MMHG | DIASTOLIC BLOOD PRESSURE: 80 MMHG | BODY MASS INDEX: 29.95 KG/M2

## 2019-05-22 DIAGNOSIS — J45.909 UNSPECIFIED ASTHMA, UNCOMPLICATED: ICD-10-CM

## 2019-05-22 DIAGNOSIS — Z82.61 FAMILY HISTORY OF ARTHRITIS: ICD-10-CM

## 2019-05-22 DIAGNOSIS — J45.901 UNSPECIFIED ASTHMA WITH (ACUTE) EXACERBATION: ICD-10-CM

## 2019-05-22 DIAGNOSIS — Z86.79 PERSONAL HISTORY OF OTHER DISEASES OF THE CIRCULATORY SYSTEM: ICD-10-CM

## 2019-05-22 DIAGNOSIS — Z82.49 FAMILY HISTORY OF ISCHEMIC HEART DISEASE AND OTHER DISEASES OF THE CIRCULATORY SYSTEM: ICD-10-CM

## 2019-05-22 PROCEDURE — 99205 OFFICE O/P NEW HI 60 MIN: CPT | Mod: 25

## 2019-05-22 PROCEDURE — 94010 BREATHING CAPACITY TEST: CPT

## 2019-05-22 RX ORDER — ALBUTEROL 90 MCG
AEROSOL (GRAM) INHALATION
Refills: 0 | Status: DISCONTINUED | COMMUNITY
End: 2019-05-22

## 2019-05-22 RX ORDER — UMECLIDINIUM 62.5 UG/1
62.5 AEROSOL, POWDER ORAL DAILY
Qty: 1 | Refills: 5 | Status: ACTIVE | COMMUNITY
Start: 2019-05-22 | End: 1900-01-01

## 2019-05-22 RX ORDER — BUDESONIDE AND FORMOTEROL FUMARATE DIHYDRATE 160; 4.5 UG/1; UG/1
160-4.5 AEROSOL RESPIRATORY (INHALATION)
Refills: 0 | Status: DISCONTINUED | COMMUNITY
End: 2019-05-22

## 2019-05-22 RX ORDER — ALBUTEROL SULFATE 2.5 MG/3ML
(2.5 MG/3ML) SOLUTION RESPIRATORY (INHALATION)
Qty: 1 | Refills: 5 | Status: ACTIVE | COMMUNITY
Start: 2019-05-22 | End: 1900-01-01

## 2019-05-22 NOTE — HISTORY OF PRESENT ILLNESS
[Snoring] : snoring [Witnessed Apneas] : witnessed sleep apnea [Frequent Nocturnal Awakening] : frequent nocturnal awakening [Daytime Somnolence] : daytime somnolence [ESS: ___] : ESS score [unfilled] [Unintentional Sleep while Active] : unintentional sleep while active [Unintentional Sleep While Inactive] : unintentional sleep while inactive [Awakes Unrefreshed] : awakening unrefreshed [Awakes with Headache] : headache upon awakening [Awakening With Dry Mouth] : awakening with dry mouth [Recent  Weight Gain] : recent weight gain [To Bed: ___] : ~he/she~ goes to bed at [unfilled] [Arises: ___] : arises at [unfilled] [Sleep Onset Latency: ___ minutes] : sleep onset latency of [unfilled] minutes reported [Nocturnal Awakenings: ___] : ~he/she~ typically has [unfilled] nocturnal awakenings [Daytime Sleep: ___] : daytime sleep: [unfilled] [FreeTextEntry1] : Patient has a history of asthma since childhood. During early childhood. Her asthma with severe and required intubation 3 times. He has significantly lessened in severity, but does exacerbate with exposures to cats, dogs, perfumes, pollen, and dust. She uses Symbicort on a regular basis, but without therapy requires Ventolin at least 4 times a day. She denies any chest pains, palpitations, lightheadedness, dizziness. History is positive for sinus polyposis, as well as sinusitis. She is planning surgical repair of the above

## 2019-05-22 NOTE — DISCUSSION/SUMMARY
[Obstructive Sleep Apnea] : obstructive sleep apnea [Alcohol Avoidance] : alcohol avoidance [Sedative Avoidance] : sedative avoidance [Weight Loss Program] : weight loss program [Asthma, Acute Episode] : acute episode of asthma [Decompensated] : decompensated [Severe Persistent] : severe persistent [Medication Changes Per Orders] : Medication changes are as documented in orders [Patient] : the patient [de-identified] : IGE, CBC [de-identified] : Clinically severe [de-identified] : Home sleep study [de-identified] : The pathophysiology of sleep was explained to the patient in detail. Inclusive of this was the reasoning behind and the expected response to positive airway pressure therapy. Compliance was outlined including further followup

## 2019-05-22 NOTE — PROCEDURE
[FreeTextEntry1] : Spirometry demonstrates a severe degree of airways obstruction. Please note that this was a postbronchodilator level

## 2019-05-22 NOTE — CONSULT LETTER
[Dear  ___] : Dear  [unfilled], [Consult Letter:] : I had the pleasure of evaluating your patient, [unfilled]. [Please see my note below.] : Please see my note below. [Sincerely,] : Sincerely, [FreeTextEntry3] : Jakob Colorado MD FCCP\par Pulmonary/Critical Care/Sleep Medicine\par Department of Internal Medicine\par \par [facility] [employer]\par

## 2019-05-22 NOTE — PHYSICAL EXAM
[General Appearance - Well Developed] : well developed [Normal Appearance] : normal appearance [Well Groomed] : well groomed [General Appearance - Well Nourished] : well nourished [No Deformities] : no deformities [General Appearance - In No Acute Distress] : no acute distress [Normal Conjunctiva] : the conjunctiva exhibited no abnormalities [Eyelids - No Xanthelasma] : the eyelids demonstrated no xanthelasmas [IV] : IV [Neck Appearance] : the appearance of the neck was normal [Neck Cervical Mass (___cm)] : no neck mass was observed [Jugular Venous Distention Increased] : there was no jugular-venous distention [Thyroid Diffuse Enlargement] : the thyroid was not enlarged [Thyroid Nodule] : there were no palpable thyroid nodules [Neck Circumference: ___] : neck circumference is [unfilled] [Heart Rate And Rhythm] : heart rate and rhythm were normal [Heart Sounds] : normal S1 and S2 [Murmurs] : no murmurs present [Respiration, Rhythm And Depth] : normal respiratory rhythm and effort [Exaggerated Use Of Accessory Muscles For Inspiration] : no accessory muscle use [Auscultation Breath Sounds / Voice Sounds] : lungs were clear to auscultation bilaterally [Abdomen Soft] : soft [Abdomen Tenderness] : non-tender [Abdomen Mass (___ Cm)] : no abdominal mass palpated [Abnormal Walk] : normal gait [Gait - Sufficient For Exercise Testing] : the gait was sufficient for exercise testing [Nail Clubbing] : no clubbing of the fingernails [Cyanosis, Localized] : no localized cyanosis [Petechial Hemorrhages (___cm)] : no petechial hemorrhages [Skin Color & Pigmentation] : normal skin color and pigmentation [Skin Turgor] : normal skin turgor [] : no rash [Deep Tendon Reflexes (DTR)] : deep tendon reflexes were 2+ and symmetric [Sensation] : the sensory exam was normal to light touch and pinprick [No Focal Deficits] : no focal deficits [FreeTextEntry1] : Normal

## 2019-05-23 LAB
ALBUMIN SERPL ELPH-MCNC: 4.5 G/DL
ALP BLD-CCNC: 106 U/L
ALT SERPL-CCNC: 13 U/L
ANION GAP SERPL CALC-SCNC: 15 MMOL/L
AST SERPL-CCNC: 18 U/L
BASOPHILS # BLD AUTO: 0.02 K/UL
BASOPHILS NFR BLD AUTO: 0.3 %
BILIRUB SERPL-MCNC: 0.2 MG/DL
BUN SERPL-MCNC: 20 MG/DL
CALCIUM SERPL-MCNC: 9.7 MG/DL
CHLORIDE SERPL-SCNC: 106 MMOL/L
CO2 SERPL-SCNC: 24 MMOL/L
CREAT SERPL-MCNC: 0.79 MG/DL
EOSINOPHIL # BLD AUTO: 0.07 K/UL
EOSINOPHIL NFR BLD AUTO: 1.2 %
GLUCOSE SERPL-MCNC: 107 MG/DL
HCT VFR BLD CALC: 37.1 %
HGB BLD-MCNC: 11.7 G/DL
IMM GRANULOCYTES NFR BLD AUTO: 0.3 %
LYMPHOCYTES # BLD AUTO: 2.12 K/UL
LYMPHOCYTES NFR BLD AUTO: 36.2 %
MAN DIFF?: NORMAL
MCHC RBC-ENTMCNC: 29.3 PG
MCHC RBC-ENTMCNC: 31.5 GM/DL
MCV RBC AUTO: 93 FL
MONOCYTES # BLD AUTO: 0.33 K/UL
MONOCYTES NFR BLD AUTO: 5.6 %
NEUTROPHILS # BLD AUTO: 3.29 K/UL
NEUTROPHILS NFR BLD AUTO: 56.4 %
PLATELET # BLD AUTO: 198 K/UL
POTASSIUM SERPL-SCNC: 4.3 MMOL/L
PROT SERPL-MCNC: 7.4 G/DL
RBC # BLD: 3.99 M/UL
RBC # FLD: 14 %
SODIUM SERPL-SCNC: 145 MMOL/L
WBC # FLD AUTO: 5.85 K/UL

## 2019-05-25 LAB — TOTAL IGE SMQN RAST: 218 KU/L

## 2019-05-29 ENCOUNTER — APPOINTMENT (OUTPATIENT)
Dept: OTOLARYNGOLOGY | Facility: CLINIC | Age: 54
End: 2019-05-29
Payer: MEDICAID

## 2019-05-29 VITALS
HEART RATE: 78 BPM | DIASTOLIC BLOOD PRESSURE: 94 MMHG | BODY MASS INDEX: 29.99 KG/M2 | WEIGHT: 180 LBS | SYSTOLIC BLOOD PRESSURE: 135 MMHG | HEIGHT: 65 IN

## 2019-05-29 DIAGNOSIS — J33.9 NASAL POLYP, UNSPECIFIED: ICD-10-CM

## 2019-05-29 DIAGNOSIS — T48.5X1A CHRONIC RHINITIS: ICD-10-CM

## 2019-05-29 DIAGNOSIS — J31.0 CHRONIC RHINITIS: ICD-10-CM

## 2019-05-29 DIAGNOSIS — J34.89 OTHER SPECIFIED DISORDERS OF NOSE AND NASAL SINUSES: ICD-10-CM

## 2019-05-29 DIAGNOSIS — G47.33 OBSTRUCTIVE SLEEP APNEA (ADULT) (PEDIATRIC): ICD-10-CM

## 2019-05-29 PROCEDURE — 99204 OFFICE O/P NEW MOD 45 MIN: CPT | Mod: 25

## 2019-05-29 PROCEDURE — 31231 NASAL ENDOSCOPY DX: CPT | Mod: 52

## 2019-05-29 NOTE — CONSULT LETTER
[Dear  ___] : Dear  [unfilled], [Consult Closing:] : Thank you very much for allowing me to participate in the care of this patient.  If you have any questions, please do not hesitate to contact me. [Consult Letter:] : I had the pleasure of evaluating your patient, [unfilled]. [Please see my note below.] : Please see my note below. [FreeTextEntry3] : Sincerely, \par \par Teresa Roy MD\par Otolaryngology- Facial Plastics \par [address]\[place] [address]\par (P) - [phone]\par (F) - [phone]

## 2019-05-29 NOTE — PROCEDURE
[FreeTextEntry6] : reason for exam: anterior rhinoscopy insufficient for symptom evaluation \par \par Fiberoptic nasal endoscopy was performed.  R/b/a of procedure was explained to the patient and they agreed to proceed with procedure.  Attempted to pass scope through bilateral nostrils but given extensive edema, unable to visualize any anatomy except for b/l inferior turbinate and septum which are both extremely edematous.   Septum midline\par \par scope #: 9\par

## 2019-05-29 NOTE — ASSESSMENT
[FreeTextEntry1] : Ms. 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  Explained this patient and she will discontinue immediately.  Restart flonase and nasal saline.  Take prednisone taper\par - will obtain CT sinus\par - f/up 2 weeks once off afrin - will re-evaluate and attempt to scope\par - allergy referral for [facility]\par

## 2019-05-29 NOTE — HISTORY OF PRESENT ILLNESS
[de-identified] : Ms. LYNN is a 53 year female with a h/o asthma and aspirin sensitivity (wheezing).  previously had undergone a workup a few years ago for her nasal polyps, but then stopped short of having surgery due to orthopedic injury.  Now ready to move forward.  \par + constant nasal obstruction for years, + post nasal drip and + rhinorrhea\roxie has been on multiple nasal sprays - has been using afrin every day for > 2 weeks. \par no h/o polyp surgery in the past \par never seen an allergist\par recently seen by Pulm and planned for PST for likely MADAN\par was given prednisone taper for her asthma which she is supposed to  now.  has been intubated 3 x for her asthma in the past.

## 2019-05-29 NOTE — PHYSICAL EXAM
[FreeTextEntry1] : hyponasal voice [Nasal Endoscopy Performed] : nasal endoscopy was performed, see procedure section for findings [de-identified] : copious clear [de-identified] : ++ swelling [Midline] : trachea located in midline position [Normal] : orientation to person, place, and time: normal

## 2019-06-04 ENCOUNTER — OUTPATIENT (OUTPATIENT)
Dept: OUTPATIENT SERVICES | Facility: HOSPITAL | Age: 54
LOS: 1 days | End: 2019-06-04
Payer: MEDICAID

## 2019-06-04 DIAGNOSIS — Z98.891 HISTORY OF UTERINE SCAR FROM PREVIOUS SURGERY: Chronic | ICD-10-CM

## 2019-06-04 DIAGNOSIS — Z98.51 TUBAL LIGATION STATUS: Chronic | ICD-10-CM

## 2019-06-04 DIAGNOSIS — S82.891A OTHER FRACTURE OF RIGHT LOWER LEG, INITIAL ENCOUNTER FOR CLOSED FRACTURE: Chronic | ICD-10-CM

## 2019-06-04 DIAGNOSIS — G47.33 OBSTRUCTIVE SLEEP APNEA (ADULT) (PEDIATRIC): ICD-10-CM

## 2019-06-04 DIAGNOSIS — Z90.89 ACQUIRED ABSENCE OF OTHER ORGANS: Chronic | ICD-10-CM

## 2019-06-04 PROCEDURE — G0399: CPT

## 2019-06-04 PROCEDURE — 95806 SLEEP STUDY UNATT&RESP EFFT: CPT | Mod: 26

## 2019-06-04 PROCEDURE — 95806 SLEEP STUDY UNATT&RESP EFFT: CPT

## 2019-06-11 ENCOUNTER — APPOINTMENT (OUTPATIENT)
Dept: CT IMAGING | Facility: CLINIC | Age: 54
End: 2019-06-11

## 2019-06-12 ENCOUNTER — APPOINTMENT (OUTPATIENT)
Dept: OTOLARYNGOLOGY | Facility: CLINIC | Age: 54
End: 2019-06-12

## 2019-06-13 NOTE — ED PROVIDER NOTE - CONSTITUTIONAL, MLM
[facility]  Initial Inpatient Consult Note    Referring Provider: SATNAM Rivera    Reason for Consultation: Left lower quadrant abdominal pain, history of stage IV colorectal cancer    Subjective     History of present illness:    44 y.o. female , not previously known to our service, who has a history of metastatic stage IV colorectal cancer diagnosed in 2011.  She receives majority of her care from the [facility].  She presented with new left lower quadrant pain that radiated to her lower extremity area and she is also had ongoing issues with constipation which is chronic.  She has been having abdominal pain but it was not localized to left lower quadrant until recently.    CT of the abdomen and pelvis shows small pleural effusions with an interval increase in suspected basilar pulmonary metastatic disease, mild constipation without obstruction and an interval decrease in size of a circumscribed perirectal fluid collection.  MRI shows metastatic lesions within the L2 and L3 vertebral bodies with a pathologic compression fracture of L3.    She reports that she recently had worsening of her constipation after starting Feraheme.  Prior to that she was using just a stool softener.  Since that time she has been using senna every other day with a stool softener daily and MiraLAX as needed.  This is been offering her better control.  She came to the hospital because she had intractable left lower quadrant pain that radiated to her leg.  This did not respond to her usual outpatient medication.    She reports that she has a family history of colon polyps which were diagnosed after she was diagnosed.  She had no known history of colon cancer in the family prior to her diagnosis which was prompted due to rectal bleeding that was persistent.  Her initial tumor was a T3 node positive.  She now has known liver lung and bone metastasis.  She is scheduled to follow-up with her  oncologist tomorrow for a PET scan and then an office visit next week to determine her next step.    Past Medical History:  Past Medical History:   Diagnosis Date   • Cancer (CMS/HCC)     METASTATIC STAGE 4 COLORECTAL TO LIVER AND LUNGS   • Pneumothorax     RIGHT LUNG     Past Surgical History:  Past Surgical History:   Procedure Laterality Date   • CHEST TUBE INSERTION     • COLON RESECTION      COLORECTAL RESECTION      Social History:   Social History     Tobacco Use   • Smoking status: Never Smoker   • Smokeless tobacco: Never Used   Substance Use Topics   • Alcohol use: No      Family History:  Family History   Problem Relation Age of Onset   • Cancer Neg Hx        Home Meds:  Medications Prior to Admission   Medication Sig Dispense Refill Last Dose   • BROCCOLI EXTRACT PO Take 1 tablet by mouth Daily.      • Calcium Citrate 150 MG capsule Take 1 capsule by mouth Daily.      • Capsicum, Cayenne, (CAYENNE PEPPER PO) Take 1 tablet by mouth Daily.      • CBD (cannabidiol) oral oil Take  by mouth 2 (Two) Times a Day. Takes 0.5 mg by mouth every morning and evening.      • dexamethasone (DECADRON) 4 MG tablet Take 4 mg by mouth. Takes 1 tab twice daily on days 2 and 3 of chemotherapy as directed      • docusate sodium (COLACE) 100 MG capsule Take 100 mg by mouth 2 (Two) Times a Day.      • fexofenadine (ALLEGRA) 180 MG tablet Take 180 mg by mouth Daily.      • Green Tea, Lorena sinensis, (GREEN TEA EXTRACT PO) Take 1 tablet by mouth Daily.      • HYDROmorphone (DILAUDID) 2 MG tablet Take 2 mg by mouth Every 4 (Four) Hours As Needed for Moderate Pain . Takes 1-2 tabs as needed for pain      • ibuprofen (ADVIL,MOTRIN) 200 MG tablet Take 400 mg by mouth 2 (Two) Times a Day As Needed for Mild Pain .      • ibuprofen (ADVIL,MOTRIN) 600 MG tablet Take 600 mg by mouth Daily As Needed for Mild Pain . This is an old prescription she does not use often, but still has at home.      • lidocaine (LIDODERM) 5 % Place 1 patch on  the skin Daily. Remove & Discard patch within 12 hours or as directed by MD   3/28/2019 at Unknown time   • LORazepam (ATIVAN) 0.5 MG tablet Take 0.5 mg by mouth Every 4 (Four) Hours As Needed for Anxiety. Reports using once daily   3/28/19   • mirtazapine (REMERON) 15 MG tablet Take 15 mg by mouth Every Night.      • multiple vitamin 10 mL in dextrose 5 % 1,000 mL Infuse  into a venous catheter 1 (One) Time Per Week.      • norethindrone (MICRONOR) 0.35 MG tablet Take 1 tablet by mouth Every Evening.      • ondansetron (ZOFRAN) 8 MG tablet Take  by mouth Every 8 (Eight) Hours As Needed for Nausea or Vomiting.      • pantoprazole (PROTONIX) 40 MG EC tablet Take 40 mg by mouth As Needed. Only takes after chemo      • Pramoxine HCl (TUCKS HEMORRHOIDAL RE) Insert 1 application into the rectum Every Other Day.      • PREBIOTIC PRODUCT PO Take 1 tablet by mouth Daily.      • PROBIOTIC PRODUCT PO Take 1 Scoop by mouth Daily.      • prochlorperazine (COMPAZINE) 10 MG tablet Take 10 mg by mouth Every 6 (Six) Hours As Needed for Nausea or Vomiting.      • sennosides-docusate sodium (SENOKOT-S) 8.6-50 MG tablet Take 1 tablet by mouth Every Evening.      • TURMERIC PO Take 1 tablet by mouth Daily.      • zoledronic acid (ZOMETA) 4 MG/100ML solution infusion (premix) Infuse 4 mg into a venous catheter Every 3 (Three) Months.        Current Meds:     cetirizine 10 mg Oral Nightly   lactobacillus acidophilus 1 capsule Oral Daily   lidocaine 1 patch Transdermal Q24H   mirtazapine 15 mg Oral Nightly   sennosides-docusate sodium 2 tablet Oral Nightly   sodium chloride 3 mL Intravenous Q12H   vitamin C 1,000 mg Oral Daily     Allergies:  No Known Allergies  Review of Systems  Pertinent items are noted in HPI     Objective     Vital Signs  Temp:  [97.5 °F (36.4 °C)-98.7 °F (37.1 °C)] 98.7 °F (37.1 °C)  Heart Rate:  [] 87  Resp:  [16-18] 16  BP: ()/(62-80) 96/74  Physical Exam:  General Appearance:    Alert, cooperative, in  no acute distress   Head:    Normocephalic, without obvious abnormality, atraumatic   Eyes:            Lids and lashes normal, conjunctivae and sclerae normal, no   icterus   Throat:     oral mucosa moist        Lungs:     Clear to auscultation,respirations regular, even and                   unlabored    Heart:    Regular rhythm and normal rate, normal S1 and S2, no            Murmur    Chest Wall:    No abnormalities observed   Abdomen:    Soft, nondistended, nontender-midline abdominal scar which is well-healed, normal bowel sounds        Extremities:   no edema    Skin:   No  rash        Psychiatric:  Judgement and insight: normal   Orientation to person place and time: normal   Mood and affect: normal   Results Review:   I reviewed the patient's new clinical results.    Results from last 7 days   Lab Units 06/12/19  0440 06/11/19  2250   WBC 10*3/mm3 6.95 8.06   HEMOGLOBIN g/dL 9.5* 10.8*   HEMATOCRIT % 32.7* 35.9   PLATELETS 10*3/mm3 232 269     Results from last 7 days   Lab Units 06/12/19  0440 06/11/19  2250   SODIUM mmol/L 141 141   POTASSIUM mmol/L 4.3 4.0   CHLORIDE mmol/L 109* 106   CO2 mmol/L 23.9 23.4   BUN mg/dL 6 7   CREATININE mg/dL 0.54* 0.77   CALCIUM mg/dL 7.4* 8.4*   BILIRUBIN mg/dL  --  0.2   ALK PHOS U/L  --  88   ALT (SGPT) U/L  --  6   AST (SGOT) U/L  --  16   GLUCOSE mg/dL 97 110*         Lab Results   Lab Value Date/Time    LIPASE 35 06/11/2019 2250    LIPASE 36 05/09/2019 0758    LIPASE 29 03/28/2019 2100    LIPASE 27 05/04/2018 0023       Radiology:  US Non-ob Transvaginal   Final Result   Unremarkable exam.       This report was finalized on 6/12/2019 12:38 AM by Tony Huynh M.D.          US Testicular or Ovarian Vascular Limited   Final Result   Unremarkable exam.       This report was finalized on 6/12/2019 12:38 AM by Tony Huynh M.D.          CT Abdomen Pelvis With Contrast   Final Result   1. Development of small pleural effusions with an interval increase in   suspected  basilar pulmonary metastatic disease.   2. Stable tiny liver lesion, likely cyst.   3. Mild constipation without obstruction.   4. Interval decrease in size of a circumscribed perirectal fluid   collection with current and prior dimensions as above.        This report was finalized on 6/13/2019 5:43 AM by Tony Huynh M.D.          XR Hip With or Without Pelvis 2 - 3 View Left   Final Result   Negative.       This report was finalized on 6/12/2019 6:30 PM by Dr. Kamran Barbosa M.D.          MRI Lumbar Spine With & Without Contrast   Final Result   Metastatic lesions within the L2 and L3 vertebral bodies   with a pathologic compression fracture of L3 as described more detail   above. There is no significant encroachment on the spinal canal or   neural foramina at any level within the lumbar spine.       This report was finalized on 6/12/2019 3:52 PM by Dr. Efrain Pineda M.D.              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  Metastatic colorectal cancer with metastasis to the bone, liver and lung  2.  Chronic opioid related  constipation  3.  Left lower abdominal and leg pain related to L3 vertebral fracture and bone mets    Plan-  Patient's constipation is currently fairly well controlled with her current regimen.  We talked about ways that she could change her regimen if needed to better manage her symptoms.  They are also agent such as Movantik, amitiza and Relistor which could be used specifically for narcotic related constipation.    She is pushing for colonoscopy.  She has not had a colonoscopy in 5 years.  Given her diffuse metastatic disease I am not sure that it will change her management much.  I have agreed to bring her back to the office to discuss it further consider outpatient colonoscopy once she has had her PET scan and met with her oncologist to determine their management plan.      I discussed the patients findings and my recommendations with patient and nursing staff.    Kate Arroyo MD             normal... Well appearing, well nourished, awake, alert, oriented to person, place, time/situation and in no apparent distress.

## 2019-06-21 ENCOUNTER — RX RENEWAL (OUTPATIENT)
Age: 54
End: 2019-06-21

## 2019-06-21 RX ORDER — FLUTICASONE PROPIONATE 50 UG/1
50 SPRAY, METERED NASAL
Qty: 3 | Refills: 3 | Status: ACTIVE | COMMUNITY
Start: 2019-05-29 | End: 1900-01-01

## 2019-06-26 NOTE — ED STATDOCS - CROS ED RESP ALL NEG
Reviewed results in detail.  Tonia verbalized understanding.    KJ, please advise regarding interaction:    Drug-Drug Interaction Report    Cholestyramine / Pyrimidine Synthesis Inhibitors     Significance: Moderate     Warning: Use of leflunomide with cholestyramine has been reported by the  of leflunomide to result in decreased plasma concentrations of teriflunomide, which is the active metabolite of leflunomide.    Fiorella Tejeda RN 06/26/19 12:48 PM       negative...

## 2019-07-03 ENCOUNTER — APPOINTMENT (OUTPATIENT)
Dept: PULMONOLOGY | Facility: CLINIC | Age: 54
End: 2019-07-03

## 2019-07-03 RX ORDER — TRAMADOL HYDROCHLORIDE 50 MG/1
50 TABLET, COATED ORAL
Qty: 30 | Refills: 0 | Status: ACTIVE | COMMUNITY
Start: 2017-03-03

## 2019-07-03 RX ORDER — CEPHALEXIN 500 MG/1
500 CAPSULE ORAL
Qty: 30 | Refills: 0 | Status: DISCONTINUED | COMMUNITY
Start: 2019-02-21

## 2019-07-03 RX ORDER — ALBUTEROL SULFATE 90 UG/1
108 (90 BASE) INHALANT RESPIRATORY (INHALATION)
Qty: 1 | Refills: 5 | Status: ACTIVE | COMMUNITY
Start: 2019-05-22

## 2019-07-03 RX ORDER — PREDNISONE 10 MG/1
10 TABLET ORAL
Qty: 42 | Refills: 1 | Status: DISCONTINUED | COMMUNITY
Start: 2019-05-22 | End: 2019-07-03

## 2019-07-03 RX ORDER — MUPIROCIN 20 MG/G
2 OINTMENT TOPICAL
Qty: 22 | Refills: 0 | Status: DISCONTINUED | COMMUNITY
Start: 2019-02-21

## 2019-07-03 RX ORDER — ALBUTEROL SULFATE 90 UG/1
108 (90 BASE) AEROSOL, METERED RESPIRATORY (INHALATION)
Qty: 1 | Refills: 5 | Status: DISCONTINUED | COMMUNITY
Start: 2019-05-22 | End: 2019-07-03

## 2019-10-01 ENCOUNTER — OUTPATIENT (OUTPATIENT)
Dept: OUTPATIENT SERVICES | Facility: HOSPITAL | Age: 54
LOS: 1 days | End: 2019-10-01
Payer: MEDICAID

## 2019-10-01 ENCOUNTER — EMERGENCY (EMERGENCY)
Facility: HOSPITAL | Age: 54
LOS: 1 days | Discharge: HOME | End: 2019-10-01
Attending: STUDENT IN AN ORGANIZED HEALTH CARE EDUCATION/TRAINING PROGRAM
Payer: MEDICAID

## 2019-10-01 VITALS
SYSTOLIC BLOOD PRESSURE: 99 MMHG | TEMPERATURE: 98 F | HEART RATE: 107 BPM | DIASTOLIC BLOOD PRESSURE: 76 MMHG | RESPIRATION RATE: 16 BRPM | HEIGHT: 65 IN | WEIGHT: 177.91 LBS | OXYGEN SATURATION: 99 %

## 2019-10-01 VITALS
RESPIRATION RATE: 16 BRPM | OXYGEN SATURATION: 96 % | DIASTOLIC BLOOD PRESSURE: 98 MMHG | TEMPERATURE: 98 F | SYSTOLIC BLOOD PRESSURE: 158 MMHG | HEART RATE: 79 BPM

## 2019-10-01 DIAGNOSIS — Z98.51 TUBAL LIGATION STATUS: Chronic | ICD-10-CM

## 2019-10-01 DIAGNOSIS — S82.891A OTHER FRACTURE OF RIGHT LOWER LEG, INITIAL ENCOUNTER FOR CLOSED FRACTURE: Chronic | ICD-10-CM

## 2019-10-01 DIAGNOSIS — Z98.891 HISTORY OF UTERINE SCAR FROM PREVIOUS SURGERY: Chronic | ICD-10-CM

## 2019-10-01 DIAGNOSIS — Z90.89 ACQUIRED ABSENCE OF OTHER ORGANS: Chronic | ICD-10-CM

## 2019-10-01 LAB
ALBUMIN SERPL ELPH-MCNC: 4.9 G/DL — SIGNIFICANT CHANGE UP (ref 3.3–5.2)
ALP SERPL-CCNC: 104 U/L — SIGNIFICANT CHANGE UP (ref 40–120)
ALT FLD-CCNC: 10 U/L — SIGNIFICANT CHANGE UP
ANION GAP SERPL CALC-SCNC: 17 MMOL/L — SIGNIFICANT CHANGE UP (ref 5–17)
APPEARANCE UR: CLEAR — SIGNIFICANT CHANGE UP
AST SERPL-CCNC: 22 U/L — SIGNIFICANT CHANGE UP
BASOPHILS # BLD AUTO: 0.03 K/UL — SIGNIFICANT CHANGE UP (ref 0–0.2)
BASOPHILS NFR BLD AUTO: 0.5 % — SIGNIFICANT CHANGE UP (ref 0–2)
BILIRUB SERPL-MCNC: 0.5 MG/DL — SIGNIFICANT CHANGE UP (ref 0.4–2)
BILIRUB UR-MCNC: NEGATIVE — SIGNIFICANT CHANGE UP
BUN SERPL-MCNC: 18 MG/DL — SIGNIFICANT CHANGE UP (ref 8–20)
CALCIUM SERPL-MCNC: 9.8 MG/DL — SIGNIFICANT CHANGE UP (ref 8.6–10.2)
CHLORIDE SERPL-SCNC: 94 MMOL/L — LOW (ref 98–107)
CO2 SERPL-SCNC: 27 MMOL/L — SIGNIFICANT CHANGE UP (ref 22–29)
COLOR SPEC: YELLOW — SIGNIFICANT CHANGE UP
CREAT SERPL-MCNC: 0.79 MG/DL — SIGNIFICANT CHANGE UP (ref 0.5–1.3)
DIFF PNL FLD: ABNORMAL
EOSINOPHIL # BLD AUTO: 0.03 K/UL — SIGNIFICANT CHANGE UP (ref 0–0.5)
EOSINOPHIL NFR BLD AUTO: 0.5 % — SIGNIFICANT CHANGE UP (ref 0–6)
GLUCOSE SERPL-MCNC: 96 MG/DL — SIGNIFICANT CHANGE UP (ref 70–115)
GLUCOSE UR QL: NEGATIVE MG/DL — SIGNIFICANT CHANGE UP
HCT VFR BLD CALC: 44 % — SIGNIFICANT CHANGE UP (ref 34.5–45)
HGB BLD-MCNC: 14.5 G/DL — SIGNIFICANT CHANGE UP (ref 11.5–15.5)
IMM GRANULOCYTES NFR BLD AUTO: 0.3 % — SIGNIFICANT CHANGE UP (ref 0–1.5)
KETONES UR-MCNC: ABNORMAL
LEUKOCYTE ESTERASE UR-ACNC: ABNORMAL
LIDOCAIN IGE QN: 29 U/L — SIGNIFICANT CHANGE UP (ref 22–51)
LYMPHOCYTES # BLD AUTO: 1.99 K/UL — SIGNIFICANT CHANGE UP (ref 1–3.3)
LYMPHOCYTES # BLD AUTO: 30.9 % — SIGNIFICANT CHANGE UP (ref 13–44)
MCHC RBC-ENTMCNC: 29.7 PG — SIGNIFICANT CHANGE UP (ref 27–34)
MCHC RBC-ENTMCNC: 33 GM/DL — SIGNIFICANT CHANGE UP (ref 32–36)
MCV RBC AUTO: 90.2 FL — SIGNIFICANT CHANGE UP (ref 80–100)
MONOCYTES # BLD AUTO: 0.31 K/UL — SIGNIFICANT CHANGE UP (ref 0–0.9)
MONOCYTES NFR BLD AUTO: 4.8 % — SIGNIFICANT CHANGE UP (ref 2–14)
NEUTROPHILS # BLD AUTO: 4.05 K/UL — SIGNIFICANT CHANGE UP (ref 1.8–7.4)
NEUTROPHILS NFR BLD AUTO: 63 % — SIGNIFICANT CHANGE UP (ref 43–77)
NITRITE UR-MCNC: NEGATIVE — SIGNIFICANT CHANGE UP
PH UR: 6.5 — SIGNIFICANT CHANGE UP (ref 5–8)
PLATELET # BLD AUTO: 216 K/UL — SIGNIFICANT CHANGE UP (ref 150–400)
POTASSIUM SERPL-MCNC: 3.8 MMOL/L — SIGNIFICANT CHANGE UP (ref 3.5–5.3)
POTASSIUM SERPL-SCNC: 3.8 MMOL/L — SIGNIFICANT CHANGE UP (ref 3.5–5.3)
PROT SERPL-MCNC: 8.8 G/DL — HIGH (ref 6.6–8.7)
PROT UR-MCNC: 30 MG/DL
RBC # BLD: 4.88 M/UL — SIGNIFICANT CHANGE UP (ref 3.8–5.2)
RBC # FLD: 13.8 % — SIGNIFICANT CHANGE UP (ref 10.3–14.5)
SODIUM SERPL-SCNC: 138 MMOL/L — SIGNIFICANT CHANGE UP (ref 135–145)
SP GR SPEC: 1.01 — SIGNIFICANT CHANGE UP (ref 1.01–1.02)
UROBILINOGEN FLD QL: 4 MG/DL
WBC # BLD: 6.43 K/UL — SIGNIFICANT CHANGE UP (ref 3.8–10.5)
WBC # FLD AUTO: 6.43 K/UL — SIGNIFICANT CHANGE UP (ref 3.8–10.5)

## 2019-10-01 PROCEDURE — 81001 URINALYSIS AUTO W/SCOPE: CPT

## 2019-10-01 PROCEDURE — 83690 ASSAY OF LIPASE: CPT

## 2019-10-01 PROCEDURE — 99284 EMERGENCY DEPT VISIT MOD MDM: CPT | Mod: 25

## 2019-10-01 PROCEDURE — 85027 COMPLETE CBC AUTOMATED: CPT

## 2019-10-01 PROCEDURE — 76705 ECHO EXAM OF ABDOMEN: CPT | Mod: 26

## 2019-10-01 PROCEDURE — 96374 THER/PROPH/DIAG INJ IV PUSH: CPT

## 2019-10-01 PROCEDURE — 36415 COLL VENOUS BLD VENIPUNCTURE: CPT

## 2019-10-01 PROCEDURE — 96375 TX/PRO/DX INJ NEW DRUG ADDON: CPT

## 2019-10-01 PROCEDURE — 80053 COMPREHEN METABOLIC PANEL: CPT

## 2019-10-01 PROCEDURE — G9001: CPT

## 2019-10-01 PROCEDURE — 76705 ECHO EXAM OF ABDOMEN: CPT

## 2019-10-01 PROCEDURE — 99284 EMERGENCY DEPT VISIT MOD MDM: CPT

## 2019-10-01 RX ORDER — ONDANSETRON 8 MG/1
1 TABLET, FILM COATED ORAL
Qty: 12 | Refills: 0
Start: 2019-10-01 | End: 2019-10-04

## 2019-10-01 RX ORDER — FAMOTIDINE 10 MG/ML
1 INJECTION INTRAVENOUS
Qty: 10 | Refills: 0
Start: 2019-10-01 | End: 2019-10-10

## 2019-10-01 RX ORDER — ONDANSETRON 8 MG/1
4 TABLET, FILM COATED ORAL ONCE
Refills: 0 | Status: COMPLETED | OUTPATIENT
Start: 2019-10-01 | End: 2019-10-01

## 2019-10-01 RX ORDER — SODIUM CHLORIDE 9 MG/ML
1000 INJECTION INTRAMUSCULAR; INTRAVENOUS; SUBCUTANEOUS ONCE
Refills: 0 | Status: COMPLETED | OUTPATIENT
Start: 2019-10-01 | End: 2019-10-01

## 2019-10-01 RX ORDER — FAMOTIDINE 10 MG/ML
20 INJECTION INTRAVENOUS ONCE
Refills: 0 | Status: COMPLETED | OUTPATIENT
Start: 2019-10-01 | End: 2019-10-01

## 2019-10-01 RX ADMIN — Medication 30 MILLILITER(S): at 11:19

## 2019-10-01 RX ADMIN — SODIUM CHLORIDE 1000 MILLILITER(S): 9 INJECTION INTRAMUSCULAR; INTRAVENOUS; SUBCUTANEOUS at 11:21

## 2019-10-01 RX ADMIN — ONDANSETRON 4 MILLIGRAM(S): 8 TABLET, FILM COATED ORAL at 11:21

## 2019-10-01 RX ADMIN — FAMOTIDINE 20 MILLIGRAM(S): 10 INJECTION INTRAVENOUS at 11:19

## 2019-10-01 NOTE — ED ADULT NURSE NOTE - OBJECTIVE STATEMENT
patient c/o generalized abdominal pain with nausea and vomiting for 2 weeks. pain worse with food intake. tried over the counter medications unsuccessfully

## 2019-10-01 NOTE — SBIRT NOTE ADULT - NSSBIRTALCPOSREINDET_GEN_A_CORE
Provided SBIRT services: Full screen Negative. Positive reinforcement provided given patient currently within healthy guidelines.

## 2019-10-01 NOTE — ED STATDOCS - OBJECTIVE STATEMENT
Pt is a 53 y/o F, with PMHx of diverticulitis, HTN, presenting to the ED with c/o abdominal magalis, onset two weeks. Pt reports intermittent episodes of epigastric and LUQ abd pain, that is worse with eating, and improved with BMs. Pt states she is unable to tolerate PO secondary to having vomiting episodes. Reports having an episode of emesis yesterday that had a light blood tinge. Denies fever, CP, SOB, urinary sxs. Concerned that it is her diverticulitis.

## 2019-10-01 NOTE — ED STATDOCS - PHYSICAL EXAMINATION
Constitutional - well-developed; well nourished.   Head - NCAT. Airway patent.   Eyes - PERRL.   CV - RRR. no murmur. no edema.   Pulm - CTAB.   Abd - soft, epigastric and LUQ tenderness. no rebound. no guarding.   Neuro - A&Ox3. strength 5/5 x4. sensation intact x4. normal gait.   Skin - No rash.   MSK - normal ROM.

## 2019-10-01 NOTE — ED STATDOCS - ATTENDING CONTRIBUTION TO CARE
I performed a face to face history and physical exam of the patient and discussed their management with the resident/ACP. I reviewed the resident/ACP's note and agree with the documented findings and plan of care.    labs and imaging reviewed. Pt feeling much better. Pt likely with gastritis. pt instructed to f/up with gi as an outpatient and to return for any new/concerning symptoms.

## 2019-10-01 NOTE — ED STATDOCS - NS ED ROS FT
Const: + decreased PO. No fever/chills  EENMT: No photophobia/eye pain/changes in vision, No ear pain/sore throat/dysphagia,   Cardiac: No chest pain/palpitations  Resp: no SOB/cough/wheeze/stridor  Abd: + abdominal pain, + N/V. no Diarrhea  : no dysuria/frequency/discharge  MSK: No neck/back pain  Skin: no rash  Neuro: no changes in neurological status/function.

## 2019-10-01 NOTE — ED STATDOCS - PMH
Anemia    Ankle pain, right    Anxiety    Asthma    Borderline diabetes    Diverticulitis    Fibula fracture  right  Hordeolum externum of left upper eyelid    HTN (hypertension)    HTN (hypertension)    Nasal polyps    Sickle cell trait    Tibia fracture  right

## 2019-10-01 NOTE — ED STATDOCS - PROGRESS NOTE DETAILS
PT evaluated by intake physician. HPI/PE/ROS as noted above. Will follow up plan per intake physician. results reviewed with PT. Pt admits to frequent marijuana use and is considering quitting, counseled by SBIRT. Pt states she feels better at this time and is able to tolerate PO. PT verbalized understanding of diagnosis and importance of follow up at PMD. PT educated on importance of follow up and when to return to the ED. awaiting urine results

## 2019-10-01 NOTE — ED ADULT TRIAGE NOTE - CHIEF COMPLAINT QUOTE
Pt with upper abdominal pain and vomiting x's 2 weeks. Pt report being unable to eat for 2 weeks. Took OTC Nexium and Prilosec without relief. Had an episode of vomiting blood yesterday. Also states that she has had dark stools the last couple of days.

## 2019-10-01 NOTE — ED STATDOCS - PATIENT PORTAL LINK FT
You can access the FollowMyHealth Patient Portal offered by [facility] by registering at the following website: [email or web address]. By joining [employer]’s FollowMyHealth portal, you will also be able to view your health information using other applications (apps) compatible with our system.

## 2019-10-14 DIAGNOSIS — Z71.89 OTHER SPECIFIED COUNSELING: ICD-10-CM

## 2019-10-25 ENCOUNTER — APPOINTMENT (OUTPATIENT)
Dept: GASTROENTEROLOGY | Facility: CLINIC | Age: 54
End: 2019-10-25

## 2019-12-27 ENCOUNTER — APPOINTMENT (OUTPATIENT)
Dept: PULMONOLOGY | Facility: CLINIC | Age: 54
End: 2019-12-27

## 2020-02-29 NOTE — ED ADULT NURSE NOTE - INTEGUMENTARY WDL
Interval History: no acute events overnight. Pt had D Biotronik PPM placed 2/28/20. Pressure dressing was removed this morning and site appeared dry, without swelling.   CXR reviewed. Repeat PA and lateral CXR pending    Review of Systems   Constitution: Negative for chills and fever.   Cardiovascular: Negative for chest pain, dyspnea on exertion, irregular heartbeat, near-syncope and syncope.   Respiratory: Negative for shortness of breath.    Gastrointestinal: Negative for nausea.   Neurological: Negative for headaches and weakness.     Objective:     Vital Signs (Most Recent):  Temp: 97.2 °F (36.2 °C) (02/29/20 0731)  Pulse: 85 (02/29/20 0731)  Resp: 14 (02/29/20 0731)  BP: (!) 140/82 (02/29/20 0731)  SpO2: 95 % (02/29/20 0731) Vital Signs (24h Range):  Temp:  [97 °F (36.1 °C)-98.8 °F (37.1 °C)] 97.2 °F (36.2 °C)  Pulse:  [] 85  Resp:  [13-18] 14  SpO2:  [92 %-97 %] 95 %  BP: (110-143)/(62-82) 140/82     Weight: 100 kg (220 lb 7.4 oz)  Body mass index is 39.05 kg/m².     SpO2: 95 %  O2 Device (Oxygen Therapy): room air    Physical Exam   Constitutional: She is oriented to person, place, and time. She appears well-developed and well-nourished.   HENT:   Head: Normocephalic and atraumatic.   Eyes: Pupils are equal, round, and reactive to light.   Neck: No JVD present.   Cardiovascular: Normal rate and regular rhythm.   No murmur heard.  Pulmonary/Chest: Effort normal and breath sounds normal. No respiratory distress.   Abdominal: Soft. Bowel sounds are normal. She exhibits no distension. There is no tenderness.   Musculoskeletal: She exhibits no edema.   Neurological: She is alert and oriented to person, place, and time.   Skin: Skin is warm and dry. No erythema.   Psychiatric: Her behavior is normal. Judgment and thought content normal.   Vitals reviewed.      Significant Labs:   CMP:   Recent Labs   Lab 02/29/20  0420      K 4.3      CO2 25   *   BUN 14   CREATININE 1.1   CALCIUM 9.3    ANIONGAP 11   ESTGFRAFRICA >60.0   EGFRNONAA 58.3*    and CBC:   Recent Labs   Lab 02/28/20  0825   WBC 7.78   HGB 14.9   HCT 43.6          Significant Imaging: CXR, tele reviewed   Color consistent with ethnicity/race, warm, dry intact, resilient.

## 2020-09-09 NOTE — ED ADULT NURSE NOTE - CAS TRG GEN SKIN COLOR
Detail Level: Simple Additional Notes: Patient consent was obtained to proceed with the visit and recommended plan of care after discussion of all risks and benefits, including the risks of COVID-19 exposure. Normal for race

## 2020-10-13 NOTE — ED ADULT TRIAGE NOTE - BANDS:
Referred by: Dylon Hernandez MD; Medical Diagnosis (from order):    Diagnosis Information      Diagnosis    719.45 (ICD-9-CM) - M25.552 (ICD-10-CM) - Left hip pain    726.5 (ICD-9-CM) - M76.892 (ICD-10-CM) - Enthesopathy of left hip region    V45.89 (ICD-9-CM) - Z98.890 (ICD-10-CM) - Status post tendon repair                Physical Therapy -  Initial Evaluation    Visit:  1   Treatment Diagnosis: left: hip, symptoms with increased pain/symptoms, impaired range of motion, impaired muscle length/flexibility, impaired strength, impaired gait, impaired activity tolerance, impaired tissue mobility  Date of onset: Months ago  Chart reviewed at time of initial evaluation (relevant co-morbidities, allergies, tests and medications listed): Hx L hip Grade 2-3 ABD repair 2015; Chronic LBP; Hx L TKA  Diagnostic Tests: MRI studies: reviewed.     SUBJECTIVE                                                                                                             Pt reports insidious onset of L hip soreness and limp. She does have a history of L hip abductor repair performed by Dr. Hernandez in 2015. MRI results reportedly showed intact L hip Abductors.     Pt notes pain with walking, stairs, transitioning, laying on affected side.    Pain / Symptoms:  Pain rating (out of 10): Current: 2 ; Best: 2; Worst: 6  Location: L lateral hip and lateral L thigh   Quality / Description: ache, sore.  Alleviating Factors: avoiding movement in involved area, over-the-counter medication, rest.   Function:   Limitations / Exacerbation Factors: pain, difficulty, bed mobility, meal/food prep, sleep disturbed, lower body dressing, house/yard work, sitting tasks, standing tasks, lifting/carrying, squatting/lifting and walking, community distances, household distances  Prior Level of Function: pain free ADLs and IADLs,  Patient Goals: decreased pain, increased strength and independence with ADLs/IADLs    Prior treatment: no therapies  Discharged from  hospital, home health, or skilled nursing facility in last 30 days: no    Home Environment:   Patient lives with alone  Assistance available: intermittent  Feels safe at home/work/school.  2 or more falls or an unexplained fall with injury in the last year:  No    OBJECTIVE                                                                                                                     Observed Gait:   Weight bearing: Left: full  Right: full    Analysis:;   • Left: decreased stance time and antalgic    Range of Motion (ROM)   (degrees unless noted; active unless noted; norms in ( ); negative=lacking to 0, positive=beyond 0)   WNL: RLE  Hip:      - Flexion (100-120):        • Left: 90    - Internal Rotation in supine (45-50):        • Left: 40    - External Rotation in supine (45-50):        • Left: 60    Strength  (out of 5 unless noted, standard test position unless noted, lbs tested with hand held dynamometer)   WFL: RLE  Hip:    - Flexion:        • Left: 4+    - Abduction:        • Left: 3+    - Internal Rotation:        • Left: 4    - External Rotation:        • Left: 4      Palpation:   Left Lower Extremity: Gluteus Medius: tender; Gluteus Minimus: tender; Iliotibial Band: tender; greater trochanter tenderness;          Comments / Details: Outcome Measures:   Lower Extremity Functional Scale: LEFS Calculated Total: 18 (0=extreme difficulty; 80=no difficulty) see flowsheet for additional documentation    TREATMENT                                                                                                                initial evaluation completed    Manual Therapy:  Extensive STM L Glute med, glute min, ITB    Neuromuscular Re-Education:  Seated Hip IR AROM  CKC TKE with T-band    Skilled input: verbal instruction/cues and tactile instruction/cues    Writer verbally educated and received verbal consent for hand placement, positioning of patient, and techniques to be performed today from patient for hand  placement and palpation for techniques as described above and how they are pertinent to the patient's plan of care.    Home Exercise Program: Seated Hip IR AROM  CKC TKE with T-band - sit to place foot in band to avoid potential LOB     ASSESSMENT                                                                                                           74 year old female patient has reported functional limitations listed above impacted by signs and symptoms consistent with below   • Involved:       - left hip   • Symptoms/impairments: increased pain/symptoms, impaired range of motion, impaired muscle length/flexibility, impaired strength, impaired gait, impaired activity tolerance and impaired tissue mobility  Pain/symptoms after session: 1    Prognosis: patient will benefit from skilled therapy  Rehabilitative: very good  Predicted patient presentation: Moderate (evolving) - Patient comorbidities and complexities, as defined above, may have varying impact on steady progress for prescribed plan of care.  Patient Education:   Who will be receiving education: patient  Are they ready to learn: yes  Preferred learning style: written, verbal and demonstration  Barriers to learning: no barriers apparent at this time  Results of above outlined education: Verbalizes understanding and Demonstrates understanding      PLAN                                                                                                                         The following skilled interventions to be implemented to achieve goals listed below:  Gait Training (28741)  Manual Therapy (27126)  Neuromuscular Re-Education (83588)  Therapeutic Activity (37072)  Therapeutic Exercise (81826)    Frequency / Duration: 2 times per week tapering as patient progresses for an estimated total of 12 visits for 6 weeks    Patient involved in and agreed to plan of care and goals.  Patient has been given attendance policy at time of initial evaluation.  Suggestions  for next session as indicated: Progress per plan of care      GOALS                                                                                                                       Long Term Goals: To be met by end of plan of care:      Home Exercise Program: Independent with progressed and modified home exercise program (HEP)      Pain: Decrease pain/symptoms to 1 at worst with walking  Strength: Improve involved strength to  4, L hip ABD    Stairs: Ascend and descend 4 steps, reciprocal and with reported manageable/tolerable difficulty     Patient Reported Outcome Measure: Improvement in function /disability/impairment as indicated by Lower Extremity Functional Scale (minimal detectable change - 9 points) > or =   35       Procedures and total treatment time documented Time Entry flowsheet.   Allergy;

## 2021-01-07 NOTE — PATIENT PROFILE ADULT. - FUNCTIONAL SCREEN CURRENT LEVEL: SWALLOWING (IF SCORE 2 OR MORE FOR ANY ITEM, CONSULT REHAB SERVICES), MLM)
[facility] on the 1st floor of the [facility], has received and filled the following free medication from the manufacture:    ZENPEP 25,000 USP  Qty: 900  Lot: [id number]  Exp: 01/23    Has the patient been notified? YES     or Delivery? DELIVERY FOR 1/8    Lorenzo Ronquillo  Pharmacy Technician Supervisor  [facility]  [phone]      \     (0) swallows foods/liquids without difficulty

## 2022-05-04 NOTE — DISCHARGE NOTE ADULT - IF YOU ARE A SMOKER, IT IS IMPORTANT FOR YOUR HEALTH TO STOP SMOKING. PLEASE BE AWARE THAT SECOND HAND SMOKE IS ALSO HARMFUL.
Statement Selected Purse String (Simple) Text: Given the location of the defect and the characteristics of the surrounding skin a purse string closure was deemed most appropriate.  Undermining was performed circumfirentially around the surgical defect.  A purse string suture was then placed and tightened.

## 2022-06-07 NOTE — ED ADULT NURSE NOTE - DOES PATIENT HAVE ADVANCE DIRECTIVE
No Detail Level: Detailed Quality 110: Preventive Care And Screening: Influenza Immunization: Influenza Immunization not Administered because Patient Refused. Quality 130: Documentation Of Current Medications In The Medical Record: Current Medications Documented

## 2025-05-22 NOTE — ASU PATIENT PROFILE, ADULT - NSSTREETDRUGWI_GEN_ALL_CORE_SD
Discussed transmission and symptoms with Dr Ramicone. He suggests follow up with [facility] or ER if symptoms persist/get worse.    Notified pt. She accepts 6/2 appt with Alisha at . She will go to ER if symptoms persist.    Denies withdrawal